# Patient Record
Sex: FEMALE | Race: WHITE | NOT HISPANIC OR LATINO | Employment: FULL TIME | URBAN - METROPOLITAN AREA
[De-identification: names, ages, dates, MRNs, and addresses within clinical notes are randomized per-mention and may not be internally consistent; named-entity substitution may affect disease eponyms.]

---

## 2021-03-22 ENCOUNTER — TELEPHONE (OUTPATIENT)
Dept: OBSTETRICS AND GYNECOLOGY | Facility: CLINIC | Age: 61
End: 2021-03-22

## 2021-04-26 ENCOUNTER — OFFICE VISIT (OUTPATIENT)
Dept: OBSTETRICS AND GYNECOLOGY | Facility: CLINIC | Age: 61
End: 2021-04-26
Attending: OBSTETRICS & GYNECOLOGY
Payer: COMMERCIAL

## 2021-04-26 VITALS
BODY MASS INDEX: 28.48 KG/M2 | WEIGHT: 181.44 LBS | SYSTOLIC BLOOD PRESSURE: 134 MMHG | HEIGHT: 67 IN | DIASTOLIC BLOOD PRESSURE: 88 MMHG

## 2021-04-26 DIAGNOSIS — N95.2 VAGINAL ATROPHY: Primary | ICD-10-CM

## 2021-04-26 PROCEDURE — 99203 PR OFFICE/OUTPT VISIT, NEW, LEVL III, 30-44 MIN: ICD-10-PCS | Mod: S$GLB,,, | Performed by: OBSTETRICS & GYNECOLOGY

## 2021-04-26 PROCEDURE — 99999 PR PBB SHADOW E&M-EST. PATIENT-LVL III: CPT | Mod: PBBFAC,,, | Performed by: OBSTETRICS & GYNECOLOGY

## 2021-04-26 PROCEDURE — 99999 PR PBB SHADOW E&M-EST. PATIENT-LVL III: ICD-10-PCS | Mod: PBBFAC,,, | Performed by: OBSTETRICS & GYNECOLOGY

## 2021-04-26 PROCEDURE — 99203 OFFICE O/P NEW LOW 30 MIN: CPT | Mod: S$GLB,,, | Performed by: OBSTETRICS & GYNECOLOGY

## 2021-04-26 RX ORDER — ESTRADIOL 0.1 MG/G
CREAM VAGINAL
Qty: 42.5 G | Refills: 0 | Status: SHIPPED | OUTPATIENT
Start: 2021-04-26 | End: 2022-11-17 | Stop reason: SDUPTHER

## 2021-04-26 RX ORDER — METOPROLOL TARTRATE 25 MG/1
25 TABLET, FILM COATED ORAL 2 TIMES DAILY
COMMUNITY
Start: 2021-02-05

## 2021-06-28 ENCOUNTER — CLINICAL SUPPORT (OUTPATIENT)
Dept: REHABILITATION | Facility: HOSPITAL | Age: 61
End: 2021-06-28
Payer: COMMERCIAL

## 2021-06-28 DIAGNOSIS — G89.29 CHRONIC PAIN OF RIGHT ANKLE: ICD-10-CM

## 2021-06-28 DIAGNOSIS — M76.821 POSTERIOR TIBIAL TENDINITIS OF RIGHT LEG: ICD-10-CM

## 2021-06-28 DIAGNOSIS — M76.61 TENDONITIS, ACHILLES, RIGHT: ICD-10-CM

## 2021-06-28 DIAGNOSIS — M25.571 CHRONIC PAIN OF RIGHT ANKLE: ICD-10-CM

## 2021-06-28 PROCEDURE — 97110 THERAPEUTIC EXERCISES: CPT | Mod: PO

## 2021-06-28 PROCEDURE — 97162 PT EVAL MOD COMPLEX 30 MIN: CPT | Mod: PO

## 2021-06-30 ENCOUNTER — CLINICAL SUPPORT (OUTPATIENT)
Dept: REHABILITATION | Facility: HOSPITAL | Age: 61
End: 2021-06-30
Payer: COMMERCIAL

## 2021-06-30 DIAGNOSIS — M76.821 POSTERIOR TIBIAL TENDINITIS OF RIGHT LEG: ICD-10-CM

## 2021-06-30 DIAGNOSIS — M25.571 CHRONIC PAIN OF RIGHT ANKLE: ICD-10-CM

## 2021-06-30 DIAGNOSIS — G89.29 CHRONIC PAIN OF RIGHT ANKLE: ICD-10-CM

## 2021-06-30 DIAGNOSIS — M76.61 TENDONITIS, ACHILLES, RIGHT: Primary | ICD-10-CM

## 2021-06-30 PROCEDURE — 97140 MANUAL THERAPY 1/> REGIONS: CPT | Mod: PO

## 2021-06-30 PROCEDURE — 97110 THERAPEUTIC EXERCISES: CPT | Mod: PO

## 2021-07-12 ENCOUNTER — CLINICAL SUPPORT (OUTPATIENT)
Dept: REHABILITATION | Facility: HOSPITAL | Age: 61
End: 2021-07-12
Payer: COMMERCIAL

## 2021-07-12 DIAGNOSIS — M76.821 POSTERIOR TIBIAL TENDINITIS OF RIGHT LEG: ICD-10-CM

## 2021-07-12 DIAGNOSIS — M25.571 CHRONIC PAIN OF RIGHT ANKLE: ICD-10-CM

## 2021-07-12 DIAGNOSIS — M76.61 TENDONITIS, ACHILLES, RIGHT: ICD-10-CM

## 2021-07-12 DIAGNOSIS — G89.29 CHRONIC PAIN OF RIGHT ANKLE: ICD-10-CM

## 2021-07-12 PROCEDURE — 97110 THERAPEUTIC EXERCISES: CPT | Mod: PO,CQ

## 2021-07-12 PROCEDURE — 97140 MANUAL THERAPY 1/> REGIONS: CPT | Mod: PO,CQ

## 2021-07-15 ENCOUNTER — CLINICAL SUPPORT (OUTPATIENT)
Dept: REHABILITATION | Facility: HOSPITAL | Age: 61
End: 2021-07-15
Payer: COMMERCIAL

## 2021-07-15 DIAGNOSIS — M76.61 TENDONITIS, ACHILLES, RIGHT: ICD-10-CM

## 2021-07-15 DIAGNOSIS — M76.821 POSTERIOR TIBIAL TENDINITIS OF RIGHT LEG: ICD-10-CM

## 2021-07-15 DIAGNOSIS — G89.29 CHRONIC PAIN OF RIGHT ANKLE: ICD-10-CM

## 2021-07-15 DIAGNOSIS — M25.571 CHRONIC PAIN OF RIGHT ANKLE: ICD-10-CM

## 2021-07-15 PROCEDURE — 97140 MANUAL THERAPY 1/> REGIONS: CPT | Mod: PO

## 2021-07-15 PROCEDURE — 97110 THERAPEUTIC EXERCISES: CPT | Mod: PO

## 2021-07-28 ENCOUNTER — PATIENT MESSAGE (OUTPATIENT)
Dept: OBSTETRICS AND GYNECOLOGY | Facility: CLINIC | Age: 61
End: 2021-07-28

## 2021-07-30 ENCOUNTER — OFFICE VISIT (OUTPATIENT)
Dept: OBSTETRICS AND GYNECOLOGY | Facility: CLINIC | Age: 61
End: 2021-07-30
Attending: OBSTETRICS & GYNECOLOGY
Payer: COMMERCIAL

## 2021-07-30 VITALS
SYSTOLIC BLOOD PRESSURE: 114 MMHG | DIASTOLIC BLOOD PRESSURE: 76 MMHG | HEIGHT: 67 IN | BODY MASS INDEX: 28.72 KG/M2 | WEIGHT: 183 LBS

## 2021-07-30 DIAGNOSIS — Z12.31 ENCOUNTER FOR SCREENING MAMMOGRAM FOR BREAST CANCER: ICD-10-CM

## 2021-07-30 DIAGNOSIS — Z01.419 ENCOUNTER FOR GYNECOLOGICAL EXAMINATION: Primary | ICD-10-CM

## 2021-07-30 PROCEDURE — 99999 PR PBB SHADOW E&M-EST. PATIENT-LVL III: ICD-10-PCS | Mod: PBBFAC,,, | Performed by: OBSTETRICS & GYNECOLOGY

## 2021-07-30 PROCEDURE — 99999 PR PBB SHADOW E&M-EST. PATIENT-LVL III: CPT | Mod: PBBFAC,,, | Performed by: OBSTETRICS & GYNECOLOGY

## 2021-07-30 PROCEDURE — 99396 PR PREVENTIVE VISIT,EST,40-64: ICD-10-PCS | Mod: S$GLB,,, | Performed by: OBSTETRICS & GYNECOLOGY

## 2021-07-30 PROCEDURE — 99396 PREV VISIT EST AGE 40-64: CPT | Mod: S$GLB,,, | Performed by: OBSTETRICS & GYNECOLOGY

## 2021-07-30 RX ORDER — FLUTICASONE PROPIONATE 50 MCG
SPRAY, SUSPENSION (ML) NASAL
COMMUNITY

## 2021-07-30 RX ORDER — RABEPRAZOLE SODIUM 20 MG/1
TABLET, DELAYED RELEASE ORAL
COMMUNITY

## 2021-07-30 RX ORDER — MUPIROCIN 20 MG/G
OINTMENT TOPICAL
COMMUNITY

## 2022-08-10 ENCOUNTER — PATIENT MESSAGE (OUTPATIENT)
Dept: OBSTETRICS AND GYNECOLOGY | Facility: CLINIC | Age: 62
End: 2022-08-10
Payer: COMMERCIAL

## 2022-11-17 ENCOUNTER — OFFICE VISIT (OUTPATIENT)
Dept: OBSTETRICS AND GYNECOLOGY | Facility: CLINIC | Age: 62
End: 2022-11-17
Attending: OBSTETRICS & GYNECOLOGY
Payer: COMMERCIAL

## 2022-11-17 VITALS
BODY MASS INDEX: 28.46 KG/M2 | HEIGHT: 67 IN | DIASTOLIC BLOOD PRESSURE: 82 MMHG | WEIGHT: 181.31 LBS | SYSTOLIC BLOOD PRESSURE: 132 MMHG

## 2022-11-17 DIAGNOSIS — N90.9 LESION OF FEMALE PERINEUM: ICD-10-CM

## 2022-11-17 DIAGNOSIS — Z01.419 ENCOUNTER FOR GYNECOLOGICAL EXAMINATION: Primary | ICD-10-CM

## 2022-11-17 DIAGNOSIS — N81.6 RECTOCELE: ICD-10-CM

## 2022-11-17 PROBLEM — I21.9 MYOCARDIAL INFARCTION: Status: ACTIVE | Noted: 2020-11-15

## 2022-11-17 PROBLEM — I25.10 ARTERIOSCLEROSIS OF CORONARY ARTERY: Status: ACTIVE | Noted: 2020-11-15

## 2022-11-17 PROBLEM — R94.31 ABNORMAL ELECTROCARDIOGRAPHY: Status: ACTIVE | Noted: 2020-11-15

## 2022-11-17 PROBLEM — R00.2 PALPITATIONS: Status: ACTIVE | Noted: 2020-11-15

## 2022-11-17 PROBLEM — K21.9 GASTROESOPHAGEAL REFLUX DISEASE: Status: ACTIVE | Noted: 2020-11-15

## 2022-11-17 PROBLEM — E78.5 HYPERLIPIDEMIA: Status: ACTIVE | Noted: 2022-11-17

## 2022-11-17 PROBLEM — K57.90 DIVERTICULAR DISEASE: Status: ACTIVE | Noted: 2020-11-15

## 2022-11-17 PROBLEM — Z00.00 WELLNESS EXAMINATION: Status: ACTIVE | Noted: 2022-11-17

## 2022-11-17 PROBLEM — M62.830 SPASM OF BACK MUSCLES: Status: ACTIVE | Noted: 2020-11-15

## 2022-11-17 PROBLEM — D25.9 UTERINE LEIOMYOMA: Status: ACTIVE | Noted: 2022-11-17

## 2022-11-17 PROCEDURE — 99999 PR PBB SHADOW E&M-EST. PATIENT-LVL IV: ICD-10-PCS | Mod: PBBFAC,,, | Performed by: STUDENT IN AN ORGANIZED HEALTH CARE EDUCATION/TRAINING PROGRAM

## 2022-11-17 PROCEDURE — 99999 PR PBB SHADOW E&M-EST. PATIENT-LVL IV: CPT | Mod: PBBFAC,,, | Performed by: STUDENT IN AN ORGANIZED HEALTH CARE EDUCATION/TRAINING PROGRAM

## 2022-11-17 PROCEDURE — 88312 SPECIAL STAINS GROUP 1: CPT | Performed by: PATHOLOGY

## 2022-11-17 PROCEDURE — 99396 PREV VISIT EST AGE 40-64: CPT | Mod: S$GLB,,, | Performed by: STUDENT IN AN ORGANIZED HEALTH CARE EDUCATION/TRAINING PROGRAM

## 2022-11-17 PROCEDURE — 99396 PR PREVENTIVE VISIT,EST,40-64: ICD-10-PCS | Mod: S$GLB,,, | Performed by: STUDENT IN AN ORGANIZED HEALTH CARE EDUCATION/TRAINING PROGRAM

## 2022-11-17 PROCEDURE — 88305 TISSUE EXAM BY PATHOLOGIST: CPT | Performed by: PATHOLOGY

## 2022-11-17 PROCEDURE — 88312 SPECIAL STAINS GROUP 1: CPT | Mod: 26,,, | Performed by: PATHOLOGY

## 2022-11-17 PROCEDURE — 88305 TISSUE EXAM BY PATHOLOGIST: ICD-10-PCS | Mod: 26,,, | Performed by: PATHOLOGY

## 2022-11-17 PROCEDURE — 88312 PR  SPECIAL STAINS,GROUP I: ICD-10-PCS | Mod: 26,,, | Performed by: PATHOLOGY

## 2022-11-17 PROCEDURE — 88305 TISSUE EXAM BY PATHOLOGIST: CPT | Mod: 26,,, | Performed by: PATHOLOGY

## 2022-11-17 RX ORDER — ROSUVASTATIN CALCIUM 5 MG/1
1 TABLET, COATED ORAL DAILY
COMMUNITY
Start: 2022-05-11

## 2022-11-17 RX ORDER — ESTRADIOL 0.1 MG/G
CREAM VAGINAL
Qty: 42.5 G | Refills: 0 | Status: SHIPPED | OUTPATIENT
Start: 2022-11-17 | End: 2023-07-26 | Stop reason: SDUPTHER

## 2022-11-17 RX ORDER — ROSUVASTATIN CALCIUM 5 MG/1
5 TABLET, COATED ORAL DAILY
COMMUNITY
Start: 2022-08-15

## 2022-11-17 NOTE — PROGRESS NOTES
"Chief Complaint: Well Woman Exam     HPI:      Cherry Kaiser is a 62 y.o.  who presents today for well woman exam.  Patient has had a hysterectomy. Using estrogen cream on perineum. Also does have some hot flashes. Also endorses that sometimes she has to strain and manually splint when defecating, interested in UroGyn referral. No other complaints. Specifically, patient denies abnormal vaginal bleeding, discharge, pelvic pain, urinary problems, or changes in appetite. Ms. Kaiser is currently sexually active with a single male partner.    Previous Pap: normal , no longer indicated   Previous Mammogram: reports normal - has next one scheduled at [facility] next week   Most Recent Colonoscopy: UTD per pt    Past Medical History:   Diagnosis Date    Anemia     Heart attack 2016    Hyperlipidemia      Past Surgical History:   Procedure Laterality Date    HYSTERECTOMY      WISDOM TOOTH EXTRACTION       Social History     Socioeconomic History    Marital status:    Tobacco Use    Smoking status: Never    Smokeless tobacco: Never   Substance and Sexual Activity    Alcohol use: Yes     Comment: occasional     Drug use: Never    Sexual activity: Yes     Partners: Male     Birth control/protection: See Surgical Hx     Review of patient's allergies indicates:  No Known Allergies    Family History   Problem Relation Age of Onset    Hyperlipidemia Father     Progressive Supranuclear Palsy Father     Breast cancer Mother     Colon cancer Mother     Breast cancer Maternal Aunt     Diabetes Neg Hx     Hypertension Neg Hx     Ovarian cancer Neg Hx     Stroke Neg Hx      OB History          3    Para   3    Term   3            AB        Living   3         SAB        IAB        Ectopic        Multiple        Live Births   3               Physical Exam:      PHYSICAL EXAM:  /82   Ht 5' 7" (1.702 m)   Wt 82.2 kg (181 lb 5.3 oz)   BMI 28.40 kg/m²   Body mass index is 28.4 kg/m².     APPEARANCE: " Well nourished, well developed, in no acute distress.  PSYCH: Appropriate mood and affect.  SKIN: No acne or hirsutism  NECK: Neck symmetric without masses  NODES: No inguinal, axillary, lymph node enlargement  ABDOMEN: Soft.  No tenderness or masses.    CARDIOVASCULAR: No edema of peripheral extremities  BREASTS: Symmetrical, no visible skin lesions. No palpable masses. No nipple discharge bilaterally.  PELVIC: Normal external genitalia without lesions.  Normal hair distribution.  Adequate perineal body, normal urethral meatus. Flat macular lesion on right side of perineum - pt reports this is new.  Vagina moist and smooth, atrophic. Without lesions. Without discharge.  Cervix absnet.  No significant cystocele. +mild rectocele.  Bimanual exam shows absent uterus. No midline masses.    Assessment/Plan:     Encounter for gynecological examination  - pelvic exam normal  - paps no longer indicated  - [facility] next wk  - reports colonoscopy UTD  - UroGyn referral for symptomatic rectocele  - lesion of perineum, biopsied, see procedure note  - refill estrogen cream to pharmacy    Rectocele  -     Ambulatory referral/consult to Urogynecology; Future; Expected date: 11/24/2022    Lesion of female perineum  -     Specimen to Pathology, Ob/Gyn      Follow up in about 1 year (around 11/17/2023) for annual exam.    Counseling:     Patient was counseled today on current ASCCP pap guidelines, the recommendation for yearly physical exams, safe driving habits, breast self awareness and annual mammograms. She is to see her PCP for other health maintenance.     Use of the [facility] Patient Portal discussed and encouraged during today's visit.     Deena Nieto MD  Obstetrics and Gynecology  [facility]

## 2022-12-01 LAB
FINAL PATHOLOGIC DIAGNOSIS: NORMAL
GROSS: NORMAL
Lab: NORMAL
MICROSCOPIC EXAM: NORMAL

## 2022-12-05 ENCOUNTER — CLINICAL SUPPORT (OUTPATIENT)
Dept: UROGYNECOLOGY | Facility: CLINIC | Age: 62
End: 2022-12-05
Attending: STUDENT IN AN ORGANIZED HEALTH CARE EDUCATION/TRAINING PROGRAM
Payer: COMMERCIAL

## 2022-12-05 DIAGNOSIS — N81.6 RECTOCELE: ICD-10-CM

## 2022-12-05 NOTE — PROGRESS NOTES
Established Patient - Audio Only Telehealth Visit     The patient location is: home  The chief complaint leading to consultation is: POP  Visit type: Virtual visit with audio only (telephone)  Total time spent with patient: 7min    Cherry Kaiser complains of trouble defecating since her hysterectomy 6 years ago. She reports that she often needs to press on her perineum to void when stool is more firm. Fiber makes this worse. She also reports rare urinary incontinence.      Referring Provider:  Deena Nieto MD  Pelvic Health Screening:     Do you...   Usually experience frequent urination? no  Usually leak urine with a feeling of urgency or strong sensation of needing to go to the bathroom? no  Usually leak urine with coughing, sneezing, laughing or exercise or exertion? no  Usually experience small amounts or drops of urine leakage? no    Have a bulge or something falling out that you can see or feel in your vaginal area or rectum? yes  Have to push on the vagina or around the rectum to have complete a bowel movement? yes  Usually push up on the bulge in the vaginal area with your fingers to start or complete urination? no    Usually lose stool beyond your control or have trouble with stool passing? no  Experience a strong sense of urgency and must rush to the bathroom to have a bowel movement? no    Patient identifies POP as most bothersome.    Background:  Relevant History:  OB:  Hysterectomy    Other:  Heart attack  HLD       OB History    Para Term  AB Living   3 3 3 0 0 3   SAB IAB Ectopic Multiple Live Births   0 0 0 0 3        Previous Treatment:  None per patient    Patient educated on pelvic health, including symptomatic and asymptomatic POP and directed to the pelvic health website and [employer] resources for additional information. Appt scheduled and confirmed.

## 2022-12-06 ENCOUNTER — PATIENT MESSAGE (OUTPATIENT)
Dept: UROLOGY | Facility: CLINIC | Age: 62
End: 2022-12-06
Payer: COMMERCIAL

## 2022-12-09 ENCOUNTER — TELEPHONE (OUTPATIENT)
Dept: SURGERY | Facility: CLINIC | Age: 62
End: 2022-12-09
Payer: COMMERCIAL

## 2022-12-09 NOTE — TELEPHONE ENCOUNTER
Called patient back. No answer, left a message. This is regarding appointment on 12/21, it needs to be rescheduled to 12/20, if patient is able to make it.

## 2022-12-09 NOTE — TELEPHONE ENCOUNTER
----- Message from Clare Miller sent at 12/9/2022  1:10 PM CST -----  Contact: pt  Pt requesting call back RE: returning call in regards to appt change, would like to know what time        Confirmed contact below:  Contact Name:Cherry Kaiser  Phone Number: [phone]

## 2022-12-14 ENCOUNTER — TELEPHONE (OUTPATIENT)
Dept: SURGERY | Facility: CLINIC | Age: 62
End: 2022-12-14
Payer: COMMERCIAL

## 2022-12-14 NOTE — TELEPHONE ENCOUNTER
Talked to patient, will rescheduled 12/21 appointment to 01/04 in multi-d clinic with both Dr. Garcia and Dr. Macias. Patient verbalized understanding.

## 2022-12-14 NOTE — TELEPHONE ENCOUNTER
----- Message from Jackie Gaspar RN sent at 12/14/2022  9:04 AM CST -----  Regarding: FW: Move to Jan 4th [facility]    ----- Message -----  From: Corrie Garcia MD  Sent: 12/13/2022   4:30 PM CST  To: Radha Denton Staff  Subject: Move to Jan 4th [facility]

## 2022-12-23 ENCOUNTER — TELEPHONE (OUTPATIENT)
Dept: UROGYNECOLOGY | Facility: CLINIC | Age: 62
End: 2022-12-23
Payer: COMMERCIAL

## 2022-12-23 NOTE — TELEPHONE ENCOUNTER
Spoke to patient, told her to arrive at 9 am to see Dr. Garcia, then Dr. Macias will see at 9:30.

## 2022-12-23 NOTE — TELEPHONE ENCOUNTER
----- Message from Faiza Gallegos sent at 12/23/2022 12:11 PM CST -----  Regarding: rt a missed call   Type:  Patient Returning Call    Who Called: JULIA ALCANTAR [[id number]]    Who Left Message for Patient: unknown    Does the patient know what this is regarding? No     Best Call Back Number:[phone]    Additional Information:

## 2023-01-03 NOTE — PROGRESS NOTES
"CRS Office Visit History and Physical    Referring Md:   Corrie Garcia Md  [address] [place] [address]    SUBJECTIVE:     Chief Complaint: Splinting, constipation    History of Present Illness:  The patient is new patient to this practice.   Course is as follows:  Patient is a 62 y.o. female presents with splinting and constipation.  Has to press on perineum to evacuate her rectum when her stools are hard.  Stool is hard most of the time.  Started after her vaginal hysterectomy 10 years ago (c/b cystotomy), but worse over the last year.  Also sees a bulge from vagina when she is sitting on toilet.  Tried Metamucil in the past ( had diarrhea when she took full dose mixed with smoothie, then decreased dose and felt like it made her stools hard).  Drinks 3-4 glasses of water/day.  No prior anorectal surgery.  Mother with colon cancer > 61yo.   Gets scopes with [facility], last was last year, has had polyps in the past.  Had diverticulitis last year.    Review of patient's allergies indicates:  No Known Allergies    Past Medical History:   Diagnosis Date    Anemia     Heart attack 2016    Hyperlipidemia      Past Surgical History:   Procedure Laterality Date    HYSTERECTOMY      WISDOM TOOTH EXTRACTION       Family History   Problem Relation Age of Onset    Hyperlipidemia Father     Progressive Supranuclear Palsy Father     Breast cancer Mother     Colon cancer Mother     Breast cancer Maternal Aunt     Diabetes Neg Hx     Hypertension Neg Hx     Ovarian cancer Neg Hx     Stroke Neg Hx      Social History     Tobacco Use    Smoking status: Never    Smokeless tobacco: Never   Substance Use Topics    Alcohol use: Yes     Comment: occasional     Drug use: Never        Review of Systems:  ROS    OBJECTIVE:     Vital Signs (Most Recent)  /63 (BP Location: Left arm, Patient Position: Sitting)   Pulse 76   Resp 19   Ht 5' 7" (1.702 m)   Wt 84 kg (185 lb 3.2 oz)   SpO2 98%   BMI 29.01 kg/m² "     Physical Exam:  General: White female in no distress   Neuro: Alert and oriented x 4.  Moves all extremities.     HEENT: No icterus.  Trachea midline  Respiratory: Respirations are even and unlabored  Cardiac: Regular rate  Abdomen: Soft, non-distended  Extremities: Warm dry and intact  Skin: No rashes    Examined with Dr Macias, in [facility]    Anorectal:   External exam: Perianal skin with small skin tags, rectocele to introitus at rest. Perineal body in tact with some lichen sclerosis.  Digital exam revealed normal tone. No masses.  Some tenderness with exam. No masses.  + rectocele. Partial incomplete relaxation with Valsalva.      ASSESSMENT/PLAN:     61yo F w/ hard stools, rectocele, and need to splint to empty  - Started daily fiber supplement (Citrucel), can switch to MiraLax if worsens symptoms  - Increase water intake to at least 8 glasses of water per day.  - Pelvic Floor PT referral  - Seen with Dr Macias as well today    Corrie Garcia MD  Staff Surgeon, Colon and Rectal Surgery  [facility]

## 2023-01-04 ENCOUNTER — OFFICE VISIT (OUTPATIENT)
Dept: SURGERY | Facility: CLINIC | Age: 63
End: 2023-01-04
Attending: COLON & RECTAL SURGERY
Payer: COMMERCIAL

## 2023-01-04 ENCOUNTER — OFFICE VISIT (OUTPATIENT)
Dept: UROGYNECOLOGY | Facility: CLINIC | Age: 63
End: 2023-01-04
Payer: COMMERCIAL

## 2023-01-04 VITALS
OXYGEN SATURATION: 90 % | HEIGHT: 67 IN | WEIGHT: 185.19 LBS | HEART RATE: 76 BPM | RESPIRATION RATE: 19 BRPM | HEIGHT: 67 IN | OXYGEN SATURATION: 98 % | SYSTOLIC BLOOD PRESSURE: 119 MMHG | DIASTOLIC BLOOD PRESSURE: 63 MMHG | BODY MASS INDEX: 29.07 KG/M2 | BODY MASS INDEX: 29.07 KG/M2 | WEIGHT: 185.19 LBS | HEART RATE: 76 BPM | DIASTOLIC BLOOD PRESSURE: 63 MMHG | RESPIRATION RATE: 19 BRPM | SYSTOLIC BLOOD PRESSURE: 119 MMHG

## 2023-01-04 DIAGNOSIS — Z80.3 FAMILY HISTORY OF BREAST CANCER: ICD-10-CM

## 2023-01-04 DIAGNOSIS — Z80.0 FAMILY HISTORY OF COLON CANCER: Primary | ICD-10-CM

## 2023-01-04 DIAGNOSIS — N81.6 RECTOCELE: ICD-10-CM

## 2023-01-04 DIAGNOSIS — L90.0 LICHEN SCLEROSUS: ICD-10-CM

## 2023-01-04 DIAGNOSIS — N81.6 RECTOCELE: Primary | ICD-10-CM

## 2023-01-04 DIAGNOSIS — N39.46 URINARY INCONTINENCE, MIXED: ICD-10-CM

## 2023-01-04 DIAGNOSIS — K59.09 CHRONIC CONSTIPATION: ICD-10-CM

## 2023-01-04 DIAGNOSIS — R15.0 INCOMPLETE DEFECATION: ICD-10-CM

## 2023-01-04 DIAGNOSIS — N81.11 CYSTOCELE, MIDLINE: ICD-10-CM

## 2023-01-04 DIAGNOSIS — K64.9 HEMORRHOIDS, UNSPECIFIED HEMORRHOID TYPE: ICD-10-CM

## 2023-01-04 PROCEDURE — 99999 PR PBB SHADOW E&M-EST. PATIENT-LVL IV: ICD-10-PCS | Mod: PBBFAC,,, | Performed by: COLON & RECTAL SURGERY

## 2023-01-04 PROCEDURE — 99203 OFFICE O/P NEW LOW 30 MIN: CPT | Mod: S$GLB,,, | Performed by: COLON & RECTAL SURGERY

## 2023-01-04 PROCEDURE — 51701 INSERT BLADDER CATHETER: CPT | Mod: S$GLB,,, | Performed by: OBSTETRICS & GYNECOLOGY

## 2023-01-04 PROCEDURE — 99999 PR PBB SHADOW E&M-EST. PATIENT-LVL V: ICD-10-PCS | Mod: PBBFAC,,, | Performed by: OBSTETRICS & GYNECOLOGY

## 2023-01-04 PROCEDURE — 99203 PR OFFICE/OUTPT VISIT, NEW, LEVL III, 30-44 MIN: ICD-10-PCS | Mod: S$GLB,,, | Performed by: COLON & RECTAL SURGERY

## 2023-01-04 PROCEDURE — 87086 URINE CULTURE/COLONY COUNT: CPT | Performed by: OBSTETRICS & GYNECOLOGY

## 2023-01-04 PROCEDURE — 99215 OFFICE O/P EST HI 40 MIN: CPT | Mod: 25,S$GLB,, | Performed by: OBSTETRICS & GYNECOLOGY

## 2023-01-04 PROCEDURE — 99999 PR PBB SHADOW E&M-EST. PATIENT-LVL V: CPT | Mod: PBBFAC,,, | Performed by: OBSTETRICS & GYNECOLOGY

## 2023-01-04 PROCEDURE — 99215 PR OFFICE/OUTPT VISIT, EST, LEVL V, 40-54 MIN: ICD-10-PCS | Mod: 25,S$GLB,, | Performed by: OBSTETRICS & GYNECOLOGY

## 2023-01-04 PROCEDURE — 51701 PR INSERTION OF NON-INDWELLING BLADDER CATHETERIZATION FOR RESIDUAL UR: ICD-10-PCS | Mod: S$GLB,,, | Performed by: OBSTETRICS & GYNECOLOGY

## 2023-01-04 PROCEDURE — 99999 PR PBB SHADOW E&M-EST. PATIENT-LVL IV: CPT | Mod: PBBFAC,,, | Performed by: COLON & RECTAL SURGERY

## 2023-01-04 RX ORDER — CLOBETASOL PROPIONATE 0.5 MG/G
OINTMENT TOPICAL DAILY
Qty: 30 G | Refills: 3 | Status: SHIPPED | OUTPATIENT
Start: 2023-01-04

## 2023-01-04 NOTE — PATIENT INSTRUCTIONS
Fiber Information Sheet  Your doctor has recommended that you follow a high fiber diet. The addition of fiber to your diet can make an enormous difference in your bowel control and regularity. Fiber helps people whether they lose stool or have trouble with constipation. Fiber works by bulking the stool and keeping it formed, yet making the movement soft and easy to pass. Fiber helps keep moisture within the stool so that neither diarrhea nor hard stool occurs. Fiber makes the bowels work more regularly, but it is not a laxative. An additional bonus from eating a high fiber diet is that your risk of cancer is reduced, too.    Most of us eat some high fiber foods already, but nearly all of us do not eat the necessary amount. For example, a slice of whole wheat bread contains only about 10% of the daily recommended amount of fiber. This means if you are relying on only whole wheat bread to meet the recommended fiber requirements, you would need to eat  between 10-18 slices every day! Please note that fiber is NOT in any meat or dairy product. It is only found in grains, vegetables and fruits. The recommended daily fiber intake is 20-25 grams. Foods having high fiber content include:     Fiber One Cereal, ½ cup 13.0 g   Christina beans, ¾ cup 10.4 g   Wheat bran cereal, 1 oz 10.0 g   Kidney beans, ¾ cup 9.3 g   All Bran Cereal, ½ cup 6.0 g   Oat Bran Cereal, hot, 1 oz 4.0 g   Banana, 1medium 3.8 g   Canned pears, ½ cup 3.7 g   3 prunes or ¼ cup raisins 3.5 g   Whole Wheat Total, 1 cup 3.0 g   Carrots, ½ cup 3.2 g   Apple, small 2.8 g   Broccoli, ½ cup 2.8 g   Cauliflower, ½ cup 2.6 g   Oatmeal, 1 oz 2.5 g   Whole Wheat Toast 2.0 g   Cheerios, 1 1/3 cup 2.0 g   Baked potato with skin 2.0 g   Corn, ½ cup 1.9 g   Popcorn, 3 cups 1.9 g   Orange, medium 1.9 g   Granola bar 1.0 g   Lettuce, ½ cup 0.9 g    If you dont think that you can get enough fiber through your everyday diet, there are many good fiber supplements you can  take along with eating your high fiber diet. Some of these are: Metamucil (1 heaping teaspoon or 1-2 wafers), Citrucel (1 tablespoon), Fiberall (1-2 wafers or 1 teaspoon), Perdium (2 rounded teaspoons) and 1-2 teaspoons unprocessed bran (to mix with foods)    You may need to use the fiber supplement up to 3-4 times daily to produce normal elimination. Please follow specific package directions or call us for help in regulating the dose. You may notice some bloating and/or increased gas at first. These symptoms can be relieved by adding fiber to your diet slowly. Once your body gets used to this increased fiber, these symptoms will go away.   -------------------------------------------------------------------------------------    Bladder Irritants  Certain foods and drinks have been associated with worsening symptoms of urinary frequency, urgency, urge incontinence, or bladder pain. If you suffer from any of these conditions, you may wish to try eliminating one or more of these foods from your diet and see if your symptoms improve. If bladder symptoms are related to dietary factors, strict adherence to a diet thateliminates the food should bring marked relief in 10 days. Once you are feeling better, you can begin to add foods back into your diet, one at a time. If symptoms return, you will be able to identify the irritant. As you add foods back to your diet it is very important that you drink significant amounts of water.    -----------------------------------------------------------------------------------------------  List of Common Bladder Irritants*  Alcoholic beverages  Apples and apple juice  Cantaloupe  Carbonated beverages  Chili and spicy foods  Chocolate  Citrus fruit  Coffee (including decaffeinated)  Cranberries and cranberry juice  Grapes  Guava  Milk Products: milk, cheese, cottage cheese, yogurt, ice cream  Peaches  Pineapple  Plums  Strawberries  Sugar especially artificial sweeteners, saccharin,  aspartame, corn sweeteners, honey, fructose, sucrose, lactose  Tea  Tomatoes and tomato juice  Vitamin B complex  Vinegar  *Most people are not sensitive to ALL of these products; your goal is to find the foods that make YOUR symptoms worse.  ---------------------------------------------------------------------------------------------------    Low-acid fruit substitutions include apricots, papaya, pears and watermelon. Coffee drinkers can drink Kava or other lowacid instant drinks. Tea drinkers can substitute non-citrus herbal and sun brewed teas. Calcium carbonate co-buffered with calcium ascorbate can be substituted for Vitamin C. Prelief is a dietary supplement that works as an acid blocker for the bladder.    Where to get more information:        Overcoming Bladder Disorders by Katie Thomas and Bhupendra Smith, 1990        You Dont Have to Live with Cystitis! By Breanne Pederson, 1988  http://www.urologymanagement.org/oab  --------------------------------------------------------------------    1)  Mixed urinary incontinence, urge < stress:    --urine C&S  --Empty bladder every 3 hours.  Empty well: wait a minute, lean forward on toilet.    --Avoid dietary irritants (see sheet).  Keep diary x 3-5 days to determine your irritants.  AYESHA/MICHELLE:  Krystyna Goodman, PT, DPT or Marcy Rico PT, DPT ([employer] [employer] [facility] [employer]): (p) [phone].  (f) [phone].  --EARLY/LATE APPT OPTIONS:   TU, TH, FRI: 620 AM 1st APPT    M-TH (EVERY DAY BUT FRI): 520 PM LAST APPT    --URGE: consider medication in future. Takes 2-4 weeks to see if will have effect.  For dry mouth: get sour, sugar free lozenge or gum.    --STRESS:  Pessary vs. Sling.     2)  Concern for lichen sclerosis:  --EVERY AM apply steroid ointment/cream:  Apply dime-sized amount with finger to vaginal opening, inner lips, and all external areas (including around anus) that are irritated.  DO NOT APPLY  INTERNALLY.   --EVERY PM apply estrogen cream.  Apply dime-sized amount with finger to vaginal opening, inner lips, and all external areas (including around anus) that are irritated. Also apply small amount inside vagina with finger (insert to knuckle).    3)  Constipation with incomplete defecation, +hemorrhoids:  --hydrate well: 6-8 glasses/day (approx 64 oz)  Controlling constipation may help bladder urgency/leakage and fiber may better control cholesterol and blood glucose.  Start daily fiber.  Take 1 tsp of fiber powder (methylcellulose [citrucel] or other sugar-free powder).  Mix in 8 oz of water.  Take x 3-5 days.  Then, increase fiber by 1 tsp every 3-5 days until stool is easy to pass, less incomplete defecation.  Stop and continue at that dose.   Do not exceed 6 tsps/day.  May also use over the counter stool softener 1-2 x/day.  AVOID laxatives.    4)  Stage 2 rectocele/cystocele:  --discussed  --observation for now  --work on getting stool consistency better 1st  --don't sit on toilet more than 5 minutes  --consider surgery (A&P repair) if not improving  --start PT    5)  FH breast and colon cancer:  --genetics consult    6)  RTC 3 months.

## 2023-01-04 NOTE — PROGRESS NOTES
[facility] - [facility]BGYN [facility]  [address]  [facility] [address]    Cherry Kaiser  [id number]  1960  2023    Consulting Physician: Corrie Garcia MD   GYN: MD Abner; MD Konrad  Primary M.D.: Baldemar Vilchis MD    No chief complaint on file.      HPI:     1)  UI:  (+) HAYLIE (full bladder)  > (+) UUI (rare).  (--) pads. Daytime frequency: Q 3 hours.  Nocturia: No. (--) dysuria,  (--) hematuria,  (--) frequent UTIs.  (+) complete bladder emptying.     2)  POP:  Present--saw something in mirror when applying vaginal estrogen for lichen. Symptoms: (--).  (--) vaginal bleeding. (--) vaginal discharge. (+) sexually active.  (--) dyspareunia.  (+)  Vaginal dryness.  (+) vaginal estrogen use.     3)  BM:  (+) constipation/straining. Takes fish oil, magnesium PRN. Fiber bulked too much.  (--) chronic diarrhea. (--) hematochezia. +BRB on wiping with hemorrhoids.  (--) fecal incontinence.  (--) fecal smearing/urgency.  (+)/-- complete evacuation.      Past Medical History  Past Medical History:   Diagnosis Date    Anemia     Heart attack     Hyperlipidemia         Past Surgical History  Past Surgical History:   Procedure Laterality Date    HYSTERECTOMY      WISDOM TOOTH EXTRACTION          Hysterectomy: Yes   Date: .  Indication: fibroid.    Type: vaginal  Cervix present: No  Ovaries present: No  Other procedures at time of hysterectomy:  incidental cystotomy (fibroid attached to that)    Past Ob History     x 3.  C/s x 0.    Largest infant weight: 9#11oz.   yes FAVD. yes episiotomy.      Gynecologic History  LMP: No LMP recorded. Patient has had a hysterectomy.  Age of menarche: 12 yo  Age of menopause: with TVH  Menstrual history: h/o menorrhagia  Pap test: post hyst.  History of abnormal paps: No.  History of STIs:  No  Mammogram: Date of last: .  Result: Normal per report ([facility]).   Colonoscopy: Date of last: .  Result:  normal per report ([facility]).  Repeat  due:  2027.    DEXA: none    Family History  Family History   Problem Relation Age of Onset    Hyperlipidemia Father     Progressive Supranuclear Palsy Father     Breast cancer Mother     Colon cancer Mother     Breast cancer Maternal Aunt     Diabetes Neg Hx     Hypertension Neg Hx     Ovarian cancer Neg Hx     Stroke Neg Hx       Colon CA: Yes - mother; +polyps father  Breast CA: Yes - mother (2 separate cancers)  GYN CA: No   CA: No    Social History  Social History     Tobacco Use   Smoking Status Never   Smokeless Tobacco Never     Social History     Substance and Sexual Activity   Alcohol Use Yes    Comment: occasional    .    Social History     Substance and Sexual Activity   Drug Use Never   .  The patient is .  Resides in [place] [address].  Employment status: retired .     Allergies  Review of patient's allergies indicates:  No Known Allergies    Medications  Current Outpatient Medications on File Prior to Visit   Medication Sig Dispense Refill    cholecalciferol, vitamin D3, (VITAMIN D3 ORAL) Take by mouth.      cyanocobalamin, vitamin B-12, (VITAMIN B-12 ORAL) Take by mouth.      cyclobenzaprine HCl (FLEXERIL ORAL) Take by mouth.      diphenhydramine HCl (BENADRYL ORAL) Take by mouth.      estradioL (ESTRACE) 0.01 % (0.1 mg/gram) vaginal cream Insert 0.5 gms per vagina qhs x 2 weeks then twice weekly. 42.5 g 0    fluticasone propionate (FLONASE) 50 mcg/actuation nasal spray fluticasone propionate 50 mcg/actuation nasal spray,suspension      green tea leaf extract (GREEN TEA ORAL) Take by mouth.      magnesium oxide (MAGOX ORAL) Take 400 mg by mouth.      metoprolol tartrate (LOPRESSOR) 25 MG tablet Take 25 mg by mouth 2 (two) times daily.      MULTIVITAMIN ORAL Multivitamins      mupirocin (BACTROBAN) 2 % ointment mupirocin 2 % topical ointment      plant stanol ernei (CHOLEST OFF ORAL) Take by mouth.      RABEprazole (ACIPHEX) 20 mg tablet rabeprazole 20 mg tablet,delayed  "release      rosuvastatin (CRESTOR) 5 MG tablet Take 1 tablet by mouth once daily.      rosuvastatin (CRESTOR) 5 MG tablet Take 5 mg by mouth once daily.       No current facility-administered medications on file prior to visit.       Review of Systems A 14 point ROS was reviewed with pertinent positives as noted above in the history of present illness.      Constitutional: negative  Eyes: negative  Endocrine: negative  Gastrointestinal: negative  Cardiovascular: negative  Respiratory: negative  Allergic/Immunologic: negative  Integumentary: negative  Psychiatric: negative  Musculoskeletal: negative   Ear/Nose/Throat: negative  Neurologic: negative  Genitourinary: SEE HPI  Hematologic/Lymphatic: negative   Breast: negative    Urogynecologic Exam  /63 (BP Location: Left arm, Patient Position: Sitting)   Pulse 76   Resp 19   Ht 5' 7" (1.702 m)   Wt 84 kg (185 lb 3.2 oz)   SpO2 (!) 90%   BMI 29.01 kg/m²     GENERAL APPEARANCE:  The patient is well-developed, well-nourished.  Neck:  Supple with no thyromegaly, no carotid bruits.  Heart:  Regular rate and rhythm, no murmurs, rubs or gallops.  Lungs:  Clear.  No CVA tenderness.  Abdomen:  Soft, nontender, nondistended, no hepatosplenomegaly.  Incisions:  none    PELVIC:    External genitalia:  Normal Bartholins, Skenes and labia bilaterally.  +hypopigmentation at perineal body concerning for lichen process.    Urethra:  No caruncle, diverticulum or masses.  (+) hypermobility.    Vagina:  Atrophy (+) , no bladder masses or tender, no discharge.    Cervix:  absent  Uterus: uterus absent  Adnexa: Not palpable.    POP-Q:  Aa 0; Ba 0; C -8; Ap 0; Bp 0.  Genital hiatus 4, perineal body 2, total vaginal length 9-10.    NEUROLOGIC:  Cranial nerves 2 through 12 intact.  Strength 5/5.  DTRs 2+ lower extremities.  S2 through 4 normal.  Sacral reflexes intact.    EXT: LEVI, 2+ pulses bilaterally, no C/C/E    COUGH STRESS TEST:  negative  KEGEL: 1 /5    RECTAL:    External: "  Normal, (--) hemorrhoids, (--) dovetailing.   Internal:   per CRS    PVR: 30 mL    Impression    1. Family history of colon cancer    2. Family history of breast cancer    3. Lichen sclerosus    4. Incomplete defecation    5. Urinary incontinence, mixed    6. Chronic constipation    7. Hemorrhoids, unspecified hemorrhoid type    8. Cystocele, midline    9. Rectocele        Initial Plan  The patient was counseled regarding these issues. The patient was given a summary sheet containing each of these issues with possible options for evaluation and management. When appropriate, we also reviewed computer-generated diagrams specific to their diagnoses..  All questions were addressed to the patient's satisfaction.    1)  Mixed urinary incontinence, urge < stress:    --urine C&S  --Empty bladder every 3 hours.  Empty well: wait a minute, lean forward on toilet.    --Avoid dietary irritants (see sheet).  Keep diary x 3-5 days to determine your irritants.  METAHANSAE/MICHELLE:  Krystyna Goodman, PT, DPT or Marcy Rico PT, DPT ([employer] [employer] [facility] [employer]): (p) [phone].  (f) [phone].  --EARLY/LATE APPT OPTIONS:   TU, TH, FRI: 620 AM 1st APPT    M-TH (EVERY DAY BUT FRI): 520 PM LAST APPT    --URGE: consider medication in future. Takes 2-4 weeks to see if will have effect.  For dry mouth: get sour, sugar free lozenge or gum.    --STRESS:  Pessary vs. Sling.     2)  Concern for lichen sclerosis:  --EVERY AM apply steroid ointment/cream:  Apply dime-sized amount with finger to vaginal opening, inner lips, and all external areas (including around anus) that are irritated.  DO NOT APPLY INTERNALLY.   --EVERY PM apply estrogen cream.  Apply dime-sized amount with finger to vaginal opening, inner lips, and all external areas (including around anus) that are irritated. Also apply small amount inside vagina with finger (insert to knuckle).    3)  Constipation with incomplete defecation, +hemorrhoids:  --hydrate well: 6-8  glasses/day (approx 64 oz)  Controlling constipation may help bladder urgency/leakage and fiber may better control cholesterol and blood glucose.  Start daily fiber.  Take 1 tsp of fiber powder (methylcellulose [citrucel] or other sugar-free powder).  Mix in 8 oz of water.  Take x 3-5 days.  Then, increase fiber by 1 tsp every 3-5 days until stool is easy to pass, less incomplete defecation.  Stop and continue at that dose.   Do not exceed 6 tsps/day.  May also use over the counter stool softener 1-2 x/day.  AVOID laxatives.    4)  Stage 2 rectocele/cystocele:  --discussed  --observation for now  --work on getting stool consistency better 1st  --don't sit on toilet more than 5 minutes  --consider surgery (A&P repair) if not improving  --start PT    5)  FH breast and colon cancer:  --genetics consult    6)  RTC 3 months.     Approximately 60 min were spent in consult, 90 % in discussion.     Thank you for requesting consultation of your patient.  I look forward to participating in their care.    Franc Macias  Female Pelvic Medicine and Reconstructive Surgery  [facility] [place], [place]

## 2023-01-05 ENCOUNTER — PATIENT MESSAGE (OUTPATIENT)
Dept: HEMATOLOGY/ONCOLOGY | Facility: CLINIC | Age: 63
End: 2023-01-05
Payer: COMMERCIAL

## 2023-01-05 LAB — BACTERIA UR CULT: NO GROWTH

## 2023-02-20 PROBLEM — Z00.00 WELLNESS EXAMINATION: Status: RESOLVED | Noted: 2022-11-17 | Resolved: 2023-02-20

## 2023-02-20 PROBLEM — I21.9 MYOCARDIAL INFARCTION: Status: RESOLVED | Noted: 2020-11-15 | Resolved: 2023-02-20

## 2023-07-26 ENCOUNTER — OFFICE VISIT (OUTPATIENT)
Dept: UROGYNECOLOGY | Facility: CLINIC | Age: 63
End: 2023-07-26
Payer: COMMERCIAL

## 2023-07-26 VITALS
SYSTOLIC BLOOD PRESSURE: 118 MMHG | WEIGHT: 178.56 LBS | DIASTOLIC BLOOD PRESSURE: 80 MMHG | BODY MASS INDEX: 27.97 KG/M2

## 2023-07-26 DIAGNOSIS — Z01.419 ENCOUNTER FOR GYNECOLOGICAL EXAMINATION: ICD-10-CM

## 2023-07-26 DIAGNOSIS — Z80.0 FAMILY HISTORY OF COLON CANCER: ICD-10-CM

## 2023-07-26 DIAGNOSIS — L90.0 LICHEN SCLEROSUS: ICD-10-CM

## 2023-07-26 DIAGNOSIS — K59.09 CHRONIC CONSTIPATION: ICD-10-CM

## 2023-07-26 DIAGNOSIS — N81.6 RECTOCELE: ICD-10-CM

## 2023-07-26 DIAGNOSIS — R15.0 INCOMPLETE DEFECATION: ICD-10-CM

## 2023-07-26 DIAGNOSIS — N39.46 URINARY INCONTINENCE, MIXED: ICD-10-CM

## 2023-07-26 DIAGNOSIS — N95.2 VAGINAL ATROPHY: Primary | ICD-10-CM

## 2023-07-26 DIAGNOSIS — N95.2 VAGINAL ATROPHY: ICD-10-CM

## 2023-07-26 DIAGNOSIS — N81.11 CYSTOCELE, MIDLINE: ICD-10-CM

## 2023-07-26 DIAGNOSIS — Z80.3 FAMILY HISTORY OF BREAST CANCER: ICD-10-CM

## 2023-07-26 PROCEDURE — 99999 PR PBB SHADOW E&M-EST. PATIENT-LVL IV: CPT | Mod: PBBFAC,,, | Performed by: OBSTETRICS & GYNECOLOGY

## 2023-07-26 PROCEDURE — 99214 OFFICE O/P EST MOD 30 MIN: CPT | Mod: S$GLB,,, | Performed by: OBSTETRICS & GYNECOLOGY

## 2023-07-26 PROCEDURE — 99214 PR OFFICE/OUTPT VISIT, EST, LEVL IV, 30-39 MIN: ICD-10-PCS | Mod: S$GLB,,, | Performed by: OBSTETRICS & GYNECOLOGY

## 2023-07-26 PROCEDURE — 99999 PR PBB SHADOW E&M-EST. PATIENT-LVL IV: ICD-10-PCS | Mod: PBBFAC,,, | Performed by: OBSTETRICS & GYNECOLOGY

## 2023-07-26 RX ORDER — AZITHROMYCIN 250 MG/1
TABLET, FILM COATED ORAL
COMMUNITY
Start: 2023-02-01

## 2023-07-26 RX ORDER — ATORVASTATIN CALCIUM 10 MG/1
1 TABLET, FILM COATED ORAL DAILY
COMMUNITY
Start: 2023-07-07 | End: 2023-10-02

## 2023-07-26 RX ORDER — ATORVASTATIN CALCIUM 10 MG/1
10 TABLET, FILM COATED ORAL
COMMUNITY
Start: 2023-07-07

## 2023-07-26 RX ORDER — ESTRADIOL 0.1 MG/G
0.5 CREAM VAGINAL
Qty: 42.5 G | Refills: 0 | Status: SHIPPED | OUTPATIENT
Start: 2023-07-26 | End: 2023-07-27

## 2023-07-26 RX ORDER — BENZONATATE 100 MG/1
100 CAPSULE ORAL 3 TIMES DAILY
COMMUNITY
Start: 2023-02-01

## 2023-07-26 NOTE — PATIENT INSTRUCTIONS
1)  Mixed urinary incontinence, urge < stress:    --Empty bladder every 3 hours.  Empty well: wait a minute, lean forward on toilet.    --Avoid dietary irritants (see sheet).  Keep diary x 3-5 days to determine your irritants.  --continue pelvic floor PT exercises at home  --URGE: consider medication in future. Takes 2-4 weeks to see if will have effect.  For dry mouth: get sour, sugar free lozenge or gum.    --STRESS:  Pessary vs. Sling.     2)  Concern for lichen sclerosis:  --THREE TIMES A WEEK AT NIGHT apply estrogen cream.  Apply dime-sized amount with finger to vaginal opening, inner lips, and all external areas (including around anus) that are irritated. Also apply small amount inside vagina with finger (insert to knuckle).  --stop steroid    3)  Constipation with incomplete defecation, +hemorrhoids:  --hydrate well: 6-8 glasses/day (approx 64 oz)  --take magnesium oxide 400 mg daily or supplement like calm  --continue daily fiber  --try to add 1-2 prunes or warm prune juice (1/4 cup)  --keep food diary and avoid triggers    4)  Stage 2 rectocele/cystocele:  --discussed  --observation for now  --work on getting stool consistency better 1st  --don't sit on toilet more than 5 minutes  --consider surgery (A&P repair) if not improving  --continue PT    5)  FH breast and colon cancer:  --genetics consult--call to make appt    6)  RTC 6 months.

## 2023-07-26 NOTE — PROGRESS NOTES
Urogyn follow up  2023    [facility]  [address]  [facility] [address]    Cherry Kaiser  [id number]  1960    Cherry Kaiser is a 63 y.o. here for a urogyn follow up.  The patient's last visit with me was on 2023.    1)  UI:  (+) HAYLIE (full bladder)  > (+) UUI (rare).  (--) pads. Daytime frequency: Q 3 hours.  Nocturia: No. (--) dysuria,  (--) hematuria,  (--) frequent UTIs.  (+) complete bladder emptying.     2)  POP:  Present--saw something in mirror when applying vaginal estrogen for lichen. Symptoms: (--).  (--) vaginal bleeding. (--) vaginal discharge. (+) sexually active.  (--) dyspareunia.  (+)  Vaginal dryness.  (+) vaginal estrogen use.   --POP-Q:  Aa 0; Ba 0; C -8; Ap 0; Bp 0.  Genital hiatus 4, perineal body 2, total vaginal length 9-10.    3)  BM:  (+) constipation/straining. Takes fish oil, magnesium PRN. Fiber bulked too much.  (--) chronic diarrhea. (--) hematochezia. +BRB on wiping with hemorrhoids.  (--) fecal incontinence.  (--) fecal smearing/urgency.  (+)/-- complete evacuation.      Past Medical History  Past Medical History:   Diagnosis Date    Anemia     Heart attack     Hyperlipidemia         Past Surgical History  Past Surgical History:   Procedure Laterality Date    HYSTERECTOMY      WISDOM TOOTH EXTRACTION          Hysterectomy: Yes   Date: .  Indication: fibroid.    Type: vaginal  Cervix present: No  Ovaries present: No  Other procedures at time of hysterectomy:  incidental cystotomy (fibroid attached to that)    Past Ob History     x 3.  C/s x 0.    Largest infant weight: 9#11oz.   yes FAVD. yes episiotomy.      Gynecologic History  LMP: No LMP recorded. Patient has had a hysterectomy.  Age of menarche: 12 yo  Age of menopause: with TVH  Menstrual history: h/o menorrhagia  Pap test: post hyst.  History of abnormal paps: No.  History of STIs:  No  Mammogram: Date of last: .  Result: Normal per report ([facility]).    Colonoscopy: Date of last: 2022.  Result:  normal per report ([facility]).  Repeat due:  2027.    DEXA: none    Issues include:  Patient Active Problem List   Diagnosis    Tendonitis, Achilles, right    Posterior tibial tendinitis of right leg    Chronic pain of right ankle    Uterine leiomyoma    Abnormal electrocardiography    Arteriosclerosis of coronary artery    Diverticular disease    Gastroesophageal reflux disease    Hyperlipidemia    Palpitations    Spasm of back muscles    Family history of colon cancer    Family history of breast cancer    Lichen sclerosus    Incomplete defecation    Urinary incontinence, mixed    Rectocele    Cystocele, midline    Hemorrhoids    Chronic constipation    Vaginal atrophy       History since last visit:     1)  Mixed urinary incontinence, urge < stress:    --baseline:  (+) HAYLIE (full bladder)  > (+) UUI (rare).  (--) pads. Daytime frequency: Q 3 hours.  --currently: no major issues; no pad use  --has been going to PT    2)  Concern for lichen sclerosis:  --used steroid + estrogen daily x 2 months  --now just using estrogen cream 2x/month    3)  Constipation with incomplete defecation, +hemorrhoids:  --still having some irregularity   --taking metamucil--1 tsp--states wasn't better with increase in dose  --drinks 6 x 8oz glasses of water/day  --has not noted dietary triggers (h/o diverticulitis)  --does have to strain at times but is working with PT    4)  Stage 2 rectocele/cystocele:  --no major symptoms    5)  FH breast and colon cancer:  --did not do full genetics consult    Medications:    Current Outpatient Medications:     atorvastatin (LIPITOR) 10 MG tablet, Take 10 mg by mouth., Disp: , Rfl:     atorvastatin (LIPITOR) 10 MG tablet, Take 1 tablet by mouth once daily., Disp: , Rfl:     cholecalciferol, vitamin D3, (VITAMIN D3 ORAL), Take by mouth., Disp: , Rfl:     cyclobenzaprine HCl (FLEXERIL ORAL), Take by mouth., Disp: , Rfl:     diphenhydramine HCl (BENADRYL ORAL),  Take by mouth., Disp: , Rfl:     green tea leaf extract (GREEN TEA ORAL), Take by mouth., Disp: , Rfl:     RABEprazole (ACIPHEX) 20 mg tablet, rabeprazole 20 mg tablet,delayed release, Disp: , Rfl:     azithromycin (Z-HEMA) 250 MG tablet, Take by mouth., Disp: , Rfl:     benzonatate (TESSALON) 100 MG capsule, Take 100 mg by mouth 3 (three) times daily., Disp: , Rfl:     clobetasol 0.05% (TEMOVATE) 0.05 % Oint, Apply topically once daily. (Patient not taking: Reported on 7/26/2023), Disp: 30 g, Rfl: 3    cyanocobalamin, vitamin B-12, (VITAMIN B-12 ORAL), Take by mouth., Disp: , Rfl:     estradioL (ESTRACE) 0.01 % (0.1 mg/gram) vaginal cream, Place 0.5 g vaginally 3 (three) times a week. Insert 0.5 gms per vagina qhs x 2 weeks then twice weekly., Disp: 42.5 g, Rfl: 0    fluticasone propionate (FLONASE) 50 mcg/actuation nasal spray, fluticasone propionate 50 mcg/actuation nasal spray,suspension, Disp: , Rfl:     magnesium oxide (MAGOX ORAL), Take 400 mg by mouth., Disp: , Rfl:     metoprolol tartrate (LOPRESSOR) 25 MG tablet, Take 25 mg by mouth 2 (two) times daily., Disp: , Rfl:     MULTIVITAMIN ORAL, Multivitamins, Disp: , Rfl:     mupirocin (BACTROBAN) 2 % ointment, mupirocin 2 % topical ointment, Disp: , Rfl:     plant stanol ernie (CHOLEST OFF ORAL), Take by mouth., Disp: , Rfl:     rosuvastatin (CRESTOR) 5 MG tablet, Take 1 tablet by mouth once daily., Disp: , Rfl:     rosuvastatin (CRESTOR) 5 MG tablet, Take 5 mg by mouth once daily., Disp: , Rfl:     ROS:  As per HPI.      Exam  /80 (BP Location: Right arm, Patient Position: Sitting, BP Method: Medium (Manual))   Wt 81 kg (178 lb 9.2 oz)   BMI 27.97 kg/m²   General: alert and oriented, no acute distress  Respiratory: normal respiratory effort  Abd: soft, non-tender, non-distended    PELVIC:    External genitalia:  Normal Bartholins, Skenes and labia bilaterally.  +mild erythema at B labia minora, inner. No focal lesions/hypopigmented.   Urethra:  No  caruncle, diverticulum or masses.  (+) hypermobility.    Vagina:  Atrophy (+) , no bladder masses or tender, no discharge.    Cervix:  absent  Uterus: uterus absent  Adnexa: Not palpable.    POP-Q:  Aa 0; Ba 0; C -8; Ap 0; Bp 0.  Genital hiatus 4, perineal body 2, total vaginal length 9-10.    Impression  1. Vaginal atrophy  estradioL (ESTRACE) 0.01 % (0.1 mg/gram) vaginal cream      2. Encounter for gynecological examination  estradioL (ESTRACE) 0.01 % (0.1 mg/gram) vaginal cream      3. Family history of colon cancer        4. Family history of breast cancer        5. Lichen sclerosus        6. Incomplete defecation        7. Urinary incontinence, mixed        8. Chronic constipation        9. Rectocele        10. Cystocele, midline          We reviewed the above issues and discussed options for short-term versus long-term management of her problems.   Plan:   1)  Mixed urinary incontinence, urge < stress:    --Empty bladder every 3 hours.  Empty well: wait a minute, lean forward on toilet.    --Avoid dietary irritants (see sheet).  Keep diary x 3-5 days to determine your irritants.  --continue pelvic floor PT exercises at home  --URGE: consider medication in future. Takes 2-4 weeks to see if will have effect.  For dry mouth: get sour, sugar free lozenge or gum.    --STRESS:  Pessary vs. Sling.     2)  Concern for lichen sclerosis:  --THREE TIMES A WEEK AT NIGHT apply estrogen cream.  Apply dime-sized amount with finger to vaginal opening, inner lips, and all external areas (including around anus) that are irritated. Also apply small amount inside vagina with finger (insert to knuckle).  --stop steroid    3)  Constipation with incomplete defecation, +hemorrhoids:  --hydrate well: 6-8 glasses/day (approx 64 oz)  --take magnesium oxide 400 mg daily or supplement like calm  --continue daily fiber  --try to add 1-2 prunes or warm prune juice (1/4 cup)  --keep food diary and avoid triggers    4)  Stage 2  rectocele/cystocele:  --discussed  --observation for now  --work on getting stool consistency better 1st  --don't sit on toilet more than 5 minutes  --consider surgery (A&P repair) if not improving  --continue PT    5)  FH breast and colon cancer:  --genetics consult--call to make appt    6)  RTC 6 months.     30 minutes were spent in face to face time with this patient  90 % of this time was spent in counseling and/or coordination of care     Franc Macias MD  [facility]  Division of Female Pelvic Medicine and Reconstructive Surgery  Department of Obstetrics & Gynecology

## 2023-07-27 RX ORDER — ESTRADIOL 0.1 MG/G
0.5 CREAM VAGINAL
Qty: 42.5 G | Refills: 4 | Status: SHIPPED | OUTPATIENT
Start: 2023-07-28

## 2023-07-27 NOTE — TELEPHONE ENCOUNTER
Refill Routing Note   Medication(s) are not appropriate for processing by [facility] for the following reason(s):      Clarification of medication (Rx) details    [facility] action(s):  Defer Care Due:  None identified   Medication Therapy Plan: Please clarify directions. Is pt inserting 0.5gms vaginally once, tiwce, or 3 times weekly? MD Please advise      Appointments  past 12m or future 3m with PCP    Date Provider   Last Visit   7/26/2023 Franc Macias MD   Next Visit   Visit date not found Franc Macias MD   ED visits in past 90 days: 0        Note composed:10:20 PM 07/26/2023

## 2023-09-13 ENCOUNTER — TELEPHONE (OUTPATIENT)
Dept: HEMATOLOGY/ONCOLOGY | Facility: CLINIC | Age: 63
End: 2023-09-13
Payer: COMMERCIAL

## 2023-09-13 NOTE — TELEPHONE ENCOUNTER
Consult scheduled Monday, 9/18 at 8:30am with Cleo. Confirmed location and appt details. She voiced thanks and understanding.    ----- Message from Shaneka Solomon sent at 9/12/2023  9:34 AM CDT -----  Pt requesting appt from a referral for the following genetics.... Please call and adv @ [phone]

## 2023-09-18 ENCOUNTER — LAB VISIT (OUTPATIENT)
Dept: LAB | Facility: HOSPITAL | Age: 63
End: 2023-09-18
Attending: INTERNAL MEDICINE
Payer: COMMERCIAL

## 2023-09-18 ENCOUNTER — OFFICE VISIT (OUTPATIENT)
Dept: HEMATOLOGY/ONCOLOGY | Facility: CLINIC | Age: 63
End: 2023-09-18
Payer: COMMERCIAL

## 2023-09-18 DIAGNOSIS — Z80.3 FAMILY HISTORY OF BREAST CANCER: ICD-10-CM

## 2023-09-18 DIAGNOSIS — Z80.42 FAMILY HISTORY OF PROSTATE CANCER: ICD-10-CM

## 2023-09-18 DIAGNOSIS — Z80.42 FAMILY HISTORY OF PROSTATE CANCER: Primary | ICD-10-CM

## 2023-09-18 DIAGNOSIS — Z80.0 FAMILY HISTORY OF COLON CANCER: ICD-10-CM

## 2023-09-18 PROCEDURE — 99499 UNLISTED E&M SERVICE: CPT | Mod: S$GLB,,, | Performed by: GENETIC COUNSELOR, MS

## 2023-09-18 PROCEDURE — 96040 PR GENETIC COUNSELING, EACH 30 MIN: ICD-10-PCS | Mod: S$GLB,,, | Performed by: GENETIC COUNSELOR, MS

## 2023-09-18 PROCEDURE — 96040 PR GENETIC COUNSELING, EACH 30 MIN: CPT | Mod: S$GLB,,, | Performed by: GENETIC COUNSELOR, MS

## 2023-09-18 PROCEDURE — 99999 PR PBB SHADOW E&M-EST. PATIENT-LVL III: CPT | Mod: PBBFAC,,, | Performed by: GENETIC COUNSELOR, MS

## 2023-09-18 PROCEDURE — 99499 NO LOS: ICD-10-PCS | Mod: S$GLB,,, | Performed by: GENETIC COUNSELOR, MS

## 2023-09-18 PROCEDURE — 99999 PR PBB SHADOW E&M-EST. PATIENT-LVL III: ICD-10-PCS | Mod: PBBFAC,,, | Performed by: GENETIC COUNSELOR, MS

## 2023-09-18 PROCEDURE — 36415 COLL VENOUS BLD VENIPUNCTURE: CPT | Performed by: INTERNAL MEDICINE

## 2023-09-18 NOTE — PROGRESS NOTES
"Cancer Genetics  Hereditary and High-Risk Clinic  Department of Hematology and Oncology  [facility]    [facility]    Date of Service:  23  Visit Provider:  Cleo Rosario, [facility] [place], [facility]    Patient ID  Name: Cherry Kaiser    : 1960    MRN: [id number]      Referring Provider  Franc Macias MD  [address] [place] [address]    IMPRESSION     Cherry is a 64yo female with a maternal family history of breast, colon, prostate and other cancers, for which she meets NCCN criteria for genetic testing. While negative results in her would be uninformative, her mother and most other affected relatives are , so genetic testing is still indicated for Cherry. A sample was submitted on 23 to [facility] for CancerNext +RNAinsight panel testing, along with informed consent and insurance information.    FOCUSED PERSONAL HISTORY     Chief Complaint: Genetic Evaluation (Family history of breast and colon cancer)    History of Present Illness (HPI):  Cherry Kaiser ("Cherry"), 63 y.o., assigned female sex at birth, is new to the [facility] and to me.  She was referred by  Dr. Macias from Urogynecology  for hereditary cancer risk assessment given her maternal family history of breast, colon, prostate and other cancers. She reports that she has undergone three colonoscopies, the first of which at ~50yo identified one polyp (unknown pathology), and the second of which at ~55yo was normal. The third is detailed below.     -EGD and Colonoscopy on 22:   Normal esophagus and duodenum  Diverticulosis in the ascending, descending and sigmoid colon  1 tubular adenoma (5mm) in the transverse colon    She also reported undergoing hysterectomy (ovaries remain) in 2012 due to abnormal uterine bleeding with fibroids. She also undergoes annual mammogram, with her most recent detailed below.    -Mammogram on 22:  A dominant mass " "in the upper outer quadrant of the left breast is markedly decreased in size in the interval. There are scattered punctate calcifications in the subareolar region bilaterally, similar to the previous exam. There are no new suspicious masses, calcifications or areas of architectural distortion detected. (BIRADS 2)    Breast Cancer Risk Assessment Questionnaire  Age:  63 y.o.   Race/ethnicity:  White/Not  or /a  Weight:  178 lb  Height:  5'7"  Mammographic breast density:  scattered fibroglandular densities  Age at menarche:  14y  Age at first live childbirth:  20y  Menopausal status:  postmenopausal  Age at menopause, if applicable:  58y  Hormone replacement therapy use history:  Yes, estrace 3x/week  Breast biopsy history and findings:  None.  Thoracic radiation therapy history:  None.  Breast cancer susceptibility gene testing history:  Daughter reported had negative testing (panel?)  Ashkenazi Sabianism ancestry:  See below  Family history of breast cancer, ovarian cancer, and genetic testing:  See below    Tobacco Use  Tobacco Use: Low Risk  (2023)    Patient History     Smoking Tobacco Use: Never     Smokeless Tobacco Use: Never     Passive Exposure: Not on file     Review of Systems   Patient's Distress Score today was 5/10 (with 10 being the worst).  Patient attributes this to typical stresses of being a parent.  Patient denies experiencing suicidal or homicidal ideations (SI/HI).  Offered patient a referral to [facility], and patient declined.      FAMILY HISTORY         Cherry reported her family history of cancer as follows:  Mother: diagnosed with colon cancer at 68yo and then metastatic breast cancer at 71yo and  at 71yo  Maternal aunt: 79yo who was diagnosed with breast cancer at ~54yo; no known genetic testing  Maternal uncle: diagnosed with thyroid cancer in her 50s, them prostate cancer in his 60s and leukemia in his 60s (which caused his death)  Maternal " grandmother: diagnosed with lung cancer and  at 91yo  Maternal great uncle (grandmother's brother): diagnosed with stomach cancer and  in his 60s  Maternal great uncle (grandmother's brother): diagnosed with colon cancer and  in his 70s  Maternal great uncle (grandmother's brother): diagnosed with leukemia and  in his 70s    A family history of birth defects, intellectual disability, SIDS, sudden early death, multiple miscarriages and consanguinity were denied. Please refer to above pedigree for further details. A larger copy has been scanned in the Media tab.     DISCUSSION      MEETS CRITERIA AND PURSUING TESTING    Approximately 5-10% of breast cancers are due to hereditary causes. The majority of hereditary breast cancers (>50%) are due to mutations in BRCA1 or BRCA2.  Around 12-30% are due to mutations in other highly penetrant genes, such as PALB2, PTEN and TP53, or in moderately penetrant genes, such as RANJANA, BARD1, and CHEK2.  The remaining percentage are caused by unknown/unidentified genes. Cherry meets NCCN criteria for genetic testing based on her maternal family history of breast and prostate cancers (3 affected close relatives). She also has a maternal family history of colon, thyroid and other cancers. Therefore, she was offered phenotype-driven and broad panel testing. Cherry opted for the [facility] panel through [employer] of the following 36 genes associated with hereditary breast, gastrointestinal, gynecologic, pancreatic, prostate, skin and other cancers:    APC, RANJANA, AXIN2, BARD1, BMPR1A, BRCA1, BRCA2, BRIP1, CDH1, CDK4, CDKN2A, CHEK2, DICER1, EPCAM, GREM1, HOXB13, MLH1, MSH2, MSH3, MSH6, MUTYH, NBN, NF1, NTHL1, PALB2, PMS2, POLD1, POLE, PTEN, RAD51C, RAD51D, RECQL, SMAD4, SMARCA4, STK11, TP53    We reviewed that mutations in the highly penetrant genes put an individual at a significantly increased risk of breast, colon and/or other cancers.  There are  established screening and surgery guidelines for these syndromes. Mutations in the moderately penetrant genes increase the risk of breast, colon and/or other cancers, but less is understood regarding their role in cancer risk. There may not be standard screening or management guidelines for individuals who have mutations in these genes. Furthermore, we discussed the psychosocial implications of a positive result, including anxiety, fear and guilt if a mutation is passed on to a child. Cherry did not express concern and stated that .    We also discussed that it is typically preferred to offer genetic testing to someone in the family who has a history of a suspicious cancer, as their test results can be more informative. If they were to test negative, then we would have ruled out the known forms of hereditary breast cancer. If they were to test positive, then we would know that their cancer history was caused by a hereditary mutation and other individuals in the family could pursue predictive testing. When testing is offered to an individual without a personal history of cancer and they test negative, we do not know if that is because the cancer in the family was caused by a hereditary mutation and the patient did not inherit it, or if the cancer in the family was secondary to something aside from the aforementioned genes.     In Cherry's family, the most appropriate individual to undergo genetic testing would be her mother or other affected relatives. Unfortunately, most of them, including Cherry's mother, are . We discussed that Cherry could undergo genetic testing, with the limitation that a negative result will not provide any further information regarding her family members' genetic status.     If a genetic mutation is identified in Cherry, then her family members could consider undergoing predictive genetic testing to determine if they inherited the familial mutation.  Each first degree  relative (parents, siblings, children) has a 50% chance to have the familial mutation.     If no genetic mutation is identified in Cherry, then we are still unsure as to what caused her family history of breast, colon and other cancers.  Other individuals in the family, specifically her maternal aunt who had breast cancer and her siblings, should also consider undergoing genetic testing.  Until the cause of the her cancer family history can be established, Cherry may be at an increased risk of developing cancer in her lifetime.  We may never be able to establish the cause of her family history of cancer.     Breast Cancer Risk Stratification   Current, Estimated Breast Cancer Risk Model Used Patient's Score Patient's Risk Category   5-year Megan Model 2.8% (vs 1.8%)  [] N/A given age <35   [] Average risk (<1.7%)   [x] Increased risk (?1.7%)   10-year Tyrer-Cuzick v8.0b 6.3% (vs 3.4%)  [] <5%   [x] ?5%    Lifetime (to age 85) Tyrer-Cuzick v8.0b 12.6% (vs 7.0%)  [x] Average risk (<15%)   [] Intermediate risk (?15% - <20%)   [] Increased risk (?20%)     There are differences between these models and how her personal and family history affects these risks. Due to her >5% 10-year risk to develop breast cancer, she may be eligible for breast MRI if her genetic testing reveals no actionable mutations.     The potential outcomes of testing, including the high VUS (variant of unknown significance) rate seen in panel testing, were reviewed and implications were discussed. There is also a possibility for the patient to incur out-of-pocket costs related to this testing. Issues regarding insurance discrimination were discussed. RENNY protects against employment and health insurance discrimination, but it does not apply to life insurance, long term care or disability policies. It also does not apply to  personnel or employers with less than 15 employees. Cherry appeared to have a good understanding of the information  "as she asked appropriate questions.  A sample was submitted on 23 to [facility] .  Cherry's results should be available in approximately 3 weeks.  In the meantime, she is welcome to contact me if she has any questions, concerns, or updates to her family history.     Cherry received comprehensive counseling regarding panel testing and has elected to proceed with this testing.     ASSESSMENT / PLAN      Cherry Kaiser ("Cherry"), 63 y.o., presented today for hereditary cancer risk assessment and genetic counseling given her maternal family history of breast, colon, prostate and other cancers, for which she meets NCCN criteria for genetic testing. While negative results in her would leave the family history of cancer unexplained, most of her affected relatives (including her mother) are , so genetic testing is still indicated for Cherry. A sample was submitted to [facility], and I will contact Cherry once her results are available. A follow-up genetic counseling appointment will be scheduled if results are positive for a pathogenic mutation.        ICD-10-CM ICD-9-CM   1. Family history of prostate cancer  Z80.42 V16.42   2. Family history of colon cancer  Z80.0 V16.0   3. Family history of breast cancer  Z80.3 V16.3     1. Family history of colon cancer  - Ambulatory referral/consult to Genetics  - Genetic Misc Sendout Test, Blood; Future    2. Family history of breast cancer  - Ambulatory referral/consult to Genetics  - Genetic Misc Sendout Test, Blood; Future    3. Family history of prostate cancer  - Genetic Misc Sendout Test, Blood; Future       Genetic Test Information  Testing lab: [facility]   Test panel: CancerNext +RNAinsight  (Core:  BRCA1/BRCA2)   ICD-10 code(s): Z80.3, Z80.42, Z80.0   Verbal informed consent: Obtained   Written informed consent: Obtained   Specimen type: Blood  (Patient denies blood disorders that would necessitate a skin fibroblast specimen) "   Specimen collection by: [facility]   Specimen collection date: 09/18/2023   Results expected by: Approximately 2-3 weeks after the genetic testing lab receives the specimen   Results disclosure plan: Post-test visit if positive or complex result; otherwise, results will be communicated by phone call       Follow-up:  Follow up if genetic test results are positive for a pathogenic mutation.    Questions were encouraged and answered to the patient's satisfaction, and she verbalized understanding of the information and agreement with the plan.           Approximately 48 minutes were spent face-to-face with the patient.  Approximately 100 minutes in total were spent on this encounter, which includes face-to-face time and non-face-to-face time preparing to see the patient (e.g., review of tests), obtaining and/or reviewing separately obtained history, documenting clinical information in the electronic or other health record, independently interpreting results (not separately reported) and communicating results to the patient/family/caregiver, or care coordination (not separately reported).     This assessment is based on the history and reports provided, as well as the current scientific knowledge regarding cancer genetics.       Cleo Rosario, [facility], [facility]  Senior Genetic Counselor, Hereditary and High-Risk Clinic  Department of Hematology and Oncology  [facility]    [facility]        CC:  Dr. Franc Macias

## 2023-09-19 ENCOUNTER — DOCUMENTATION ONLY (OUTPATIENT)
Dept: HEMATOLOGY/ONCOLOGY | Facility: CLINIC | Age: 63
End: 2023-09-19
Payer: COMMERCIAL

## 2023-09-28 ENCOUNTER — TELEPHONE (OUTPATIENT)
Dept: HEMATOLOGY/ONCOLOGY | Facility: CLINIC | Age: 63
End: 2023-09-28
Payer: COMMERCIAL

## 2023-09-28 LAB
GENETIC COUNSELING?: YES
GENSO SPECIMEN TYPE: NORMAL
MISCELLANEOUS GENETIC TEST NAME: NORMAL
PARTENTAL OR SIBLING TESTING?: NO
REFERENCE LAB: NORMAL
TEST RESULT: NORMAL

## 2023-09-28 NOTE — TELEPHONE ENCOUNTER
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 We briefly discussed that these results do not impact her cancer screening recommendations, but I will see her for follow-up genetic counseling on 10/2/23 at 9:30am to discuss this with her in more detail. -Cleo Rosario, MGC, GC

## 2023-09-29 NOTE — PROGRESS NOTES
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 Cherry presents today to discuss cancer risks, management recommendations, inheritance and risks for family members. We discussed that this result does not impact her management, as she should already be undergoing colonoscopy at least every 5 years based on her mother's history (more often due to her own polyp history). She should continue to be monitored by her gastroenterologist. Additionally, she is still considered at increased risk of breast cancer given her >5% 10-year risk and will be referred to the [facility] for increased surveillance.    FOCUSED PERSONAL HISTORY     Chief Complaint: Genetic Evaluation (Monoallelic mutation of MUTYH)    History of Present Illness (HPI):  Cherry Kaiser ("Cherry"), 63 y.o., assigned female sex at birth, is returning for post-test genetic counseling.  She initially presented on 2023 after being referred by Dr. Macias from Urogynecology given her family history of breast, colon, prostate and other cancers.    She reports that she has undergone three colonoscopies, the first of which at ~52yo identified one polyp (unknown pathology), and the second of which at ~57yo was normal. The third is detailed below.     -EGD and Colonoscopy on 22:   Normal esophagus and duodenum  Diverticulosis in the ascending, " "descending and sigmoid colon  1 tubular adenoma (5mm) in the transverse colon     She also reported undergoing hysterectomy (ovaries remain) in 2012 due to abnormal uterine bleeding with fibroids. She also undergoes annual mammogram, with her most recent detailed below.     -Mammogram on 22:  A dominant mass in the upper outer quadrant of the left breast is markedly decreased in size in the interval. There are scattered punctate calcifications in the subareolar region bilaterally, similar to the previous exam. There are no new suspicious masses, calcifications or areas of architectural distortion detected. (BIRADS 2)    Breast Cancer Risk Assessment Questionnaire  Age:  63 y.o.   Race and ethnicity:  White, Not  or /a  Weight:  178 lb  Height:  5'7"  Mammographic breast density:  scattered fibroglandular densities    Age at menarche:  14y  Age at first live childbirth:  20y  Menopausal status:  postmenopausal  Age at menopause, if applicable:  58y  Hormone replacement therapy use history:  Yes, estrace cream 3x/week for ~1 year  Breast biopsy history and findings:  None.  Thoracic radiation therapy history:  None.  Breast cancer susceptibility gene testing history:  MUTYH+ (het.)  Ashkenazi Shinto ancestry:  No.  Family history of breast cancer, ovarian cancer, and genetic testing:  See below    Focused Social History  Tobacco Use: Low Risk  (2023)    Patient History     Smoking Tobacco Use: Never     Smokeless Tobacco Use: Never     Passive Exposure: Not on file     Review of Systems  Patient's Distress Score today was 2/10 (scale of 0-10 on which 10=worst).       FAMILY HISTORY         Cherry reported her family history of cancer as follows:  Mother: diagnosed with colon cancer at 68yo and then metastatic breast cancer at 69yo and  at 73yo  Maternal aunt: 79yo who was diagnosed with breast cancer at ~54yo; no known genetic testing  Maternal uncle: diagnosed with thyroid cancer in her " 50s, them prostate cancer in his 60s and leukemia in his 60s (which caused his death)  Maternal grandmother: diagnosed with lung cancer and  at 91yo  Maternal great uncle (grandmother's brother): diagnosed with stomach cancer and  in his 60s  Maternal great uncle (grandmother's brother): diagnosed with colon cancer and  in his 70s  Maternal great uncle (grandmother's brother): diagnosed with leukemia and  in his 70s    A family history of birth defects, intellectual disability, SIDS, sudden early death, multiple miscarriages and consanguinity were denied. Please refer to above pedigree for further details. A larger copy has been scanned into the Media tab.    DISCUSSION     GENETIC TEST RESULTS    Cherry had a sample submitted to [facility] on 23 for [facility] [employer] [facility] panel testing. This panel includes sequencing and/or deletion/duplication testing for the following genes known to be associated with hereditary breast, gastrointestinal, gynecologic, pancreatic, prostate and skin cancers:     APC, RANJANA, AXIN2, BARD1, BMPR1A, BRCA1, BRCA2, BRIP1, CDH1, CDK4, CDKN2A, CHEK2, DICER1, EPCAM, GREM1, HOXB13, MLH1, MSH2, MSH3, MSH6, MUTYH, NBN, NF1, NTHL1, PALB2, PMS2, POLD1, POLE, PTEN, RAD51C, RAD51D, RECQL, SMAD4, SMARCA4, STK11, TP53    The results of this testing revealed a heterozygous pathogenic mutation in MUTYH. This particular mutation is described as c.1187G>A, which results in an amino acid substitution that impacts protein function. It is a known common  mutation in certain  population and found in ~1-2% of this population. 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     Additionally, a variant of unknown significance (VUS) was identified. A VUS is a change in a gene that may or may not be related to cancer risk. However, there is not enough information available to determine if it  "is disease-causing/pathogenic ("positive") or benign ("negative").     The VUS was identified in the CHEK2 gene, pathogenic mutations in which cause an increased risk of breast, colon and prostate cancers. However, it is unknown if the particular variant found in Cherry, labeled c.1183G>C, is related to cancer risk. It has been identified in individuals with breast cancer, but also in healthy controls. Experimental studies and computer models do not agree as to whether this variant impacts protein function or is damaging. According to ClinVar, 8 other labs also classify it as a VUS. One consortium ([employer]) calls it likely pathogenic, but with no accompanying assertion criteria (only citation of a paper that tested individuals with ovarian cancer and no specific information regarding this variant could be found). Therefore, it is not recommended to alter management for Cherry or her family based on this variant at this time.     If/when this variant is reclassified, [facility] will send our office an updated report. We will release this information to Cherry when available.    CANCER RISKS     Monoallelic/heterozygous = mutation in one copy of MUTYH  Biallelic/homozygous = mutations in both copies of MUTYH     Biallelic mutations in MUTYH cause MUTYH-associated polyposis (MAP). Individuals with MAP can develop dozens (<100) of colorectal polyps, including adenomas, hyperplastic, serrated and others, and have a 70-90% lifetime risk of colorectal cancer. They also have increased risks of gastric fundic polyps (~11%) and duodenal polyps (17%-34%), with ~4% lifetime risk of duodenal cancer.  There may be additional cancer risks associated with MAP, but there is limited evidence at this time.     Individuals with a monoallelic mutation in MUTYH do not have MAP, but are carriers of MAP. They may have a 6%-13% lifetime risk of colorectal cancer, which appears to be largely dependent on family history. " Individuals with a monoallelic MUTYH mutation and a family history of colorectal cancer seem to have a higher cancer risk.      MANAGEMENT RECOMMENDATIONS     The NCCN recommends that men and women with a heterozygous/monoallelic MUTYH mutation undergo general population colorectal cancer screening, and no increased screening is warranted.     If an individual with a heterozygous/monoallelic MUTYH mutation has a first-degree relatives with colorectal cancer and/or polyposis, then screening should be based on the family history (ie colonoscopy every 5 years starting at 39yo or 10 years before the youngest age at colorectal cancer diagnosis in the family).     FAMILY MEMBERS AND INHERITANCE    We discussed how MUTYH mutations are passed through the family. Cherry's children and siblings have a 50% chance to have inherited the same MUTYH mutation identified in her. We reviewed that MUTYH mutations are typically passed down for generations, therefore Cherry inherited this from one of her parents.  Based on the family history of colon cancer on Cherry's maternal side of the family, it would appear that the MUTYH mutation was most likely inherited from the maternal grandmother (although this cannot be confirmed unless other relatives pursue testing). Therefore, it is recommended that in addition to Cherry's siblings and children, her maternal relatives consider undergoing predictive testing to determine if they inherited the familial MUTYH mutation. If a maternal relative tests positive, then it would be confirmed that it was maternally inherited.    We discussed that heterozygous MUTYH mutations are not associated with an increased risk of pediatric cancers and therefore predictive testing is not recommended until at least age 18.     Cherry received comprehensive counseling regarding her genetic test results. Benefits and limitations of genetic testing were discussed, and she was given ample opportunity to ask  questions, and all of her concerns were addressed. Cherry was provided with a paper and electronic copies of her genetic test results, along with a Family Letter. She is encouraged to contact us with any changes to her personal or family history, or if she has any questions or concerns.     ASSESSMENT / PLAN      Post-test genetic counseling was provided for Cherry as her genetic test results revealed she is a carrier of a single MUTYH mutation (c.1187G>A), which is a known  mutation in  populations. This mutation may partially explain the maternal family history of colon cancer, if was indeed maternally inherited, but it does not explain any of the other cancers in the family. Additionally, this mutation does not impact any of her management recommendations, as she should be screened based on her family history:  - Colonoscopy every 5 years or per findings (continue with her gastroenterologist)  - Increased breast cancer surveillance with breast MRI based on >5% 10-year breast cancer risk (referral to the High Risk Breast Clinic)      ICD-10-CM ICD-9-CM   1. Monoallelic mutation of MUTYH gene  Z15.89 V84.89   2. Family history of breast cancer  Z80.3 V16.3   3. Family history of colon cancer  Z80.0 V16.0     Monoallelic mutation of MUTYH gene  - Continue colonoscopy every 5 years (or per findings) with your gastroenterologist  - Family Letter provided for relatives interested in genetic testing    2. Family history of colon cancer   - see #1    3. Family history of breast cancer  - Ambulatory referral/consult to Breast Surgery   - Increased breast cancer surveillance with breast MRI      Follow-up:  Follow up if needed for MUTYH heterozygote management updates.      Approximately 60 minutes were spent face-to-face with the patient.  Approximately 100 minutes in total were spent on this encounter, which includes face-to-face time and non-face-to-face time preparing to see the patient (e.g.,  review of tests), obtaining and/or reviewing separately obtained history, documenting clinical information in the electronic or other health record, independently interpreting results (not separately reported) and communicating results to the patient/family/caregiver, or care coordination (not separately reported).     This assessment is based on the history and reports provided, as well as the current scientific knowledge regarding cancer genetics.         Cleo Rosario, [facility], [facility]  Senior Genetic Counselor, Hereditary and High-Risk Clinic  Department of Hematology and Oncology  [facility]        CC:  Dr. Franc Macias

## 2023-10-02 ENCOUNTER — OFFICE VISIT (OUTPATIENT)
Dept: HEMATOLOGY/ONCOLOGY | Facility: CLINIC | Age: 63
End: 2023-10-02
Payer: COMMERCIAL

## 2023-10-02 DIAGNOSIS — Z80.3 FAMILY HISTORY OF BREAST CANCER: ICD-10-CM

## 2023-10-02 DIAGNOSIS — Z80.0 FAMILY HISTORY OF COLON CANCER: ICD-10-CM

## 2023-10-02 DIAGNOSIS — Z15.89 MONOALLELIC MUTATION OF MUTYH GENE: Primary | ICD-10-CM

## 2023-10-02 PROCEDURE — 96040 PR GENETIC COUNSELING, EACH 30 MIN: ICD-10-PCS | Mod: S$GLB,,, | Performed by: GENETIC COUNSELOR, MS

## 2023-10-02 PROCEDURE — 96040 PR GENETIC COUNSELING, EACH 30 MIN: CPT | Mod: S$GLB,,, | Performed by: GENETIC COUNSELOR, MS

## 2023-10-02 PROCEDURE — 99499 NO LOS: ICD-10-PCS | Mod: S$GLB,,, | Performed by: GENETIC COUNSELOR, MS

## 2023-10-02 PROCEDURE — 99999 PR PBB SHADOW E&M-EST. PATIENT-LVL III: ICD-10-PCS | Mod: PBBFAC,,, | Performed by: GENETIC COUNSELOR, MS

## 2023-10-02 PROCEDURE — 99499 UNLISTED E&M SERVICE: CPT | Mod: S$GLB,,, | Performed by: GENETIC COUNSELOR, MS

## 2023-10-02 PROCEDURE — 99999 PR PBB SHADOW E&M-EST. PATIENT-LVL III: CPT | Mod: PBBFAC,,, | Performed by: GENETIC COUNSELOR, MS

## 2023-10-02 NOTE — PATIENT INSTRUCTIONS
Your results do not explain the family history of breast or other cancers, but may explain the later-onset colon cancers on your mother's side (if the MUTYH mutation was maternally inherited). Other family members may want to pursue their own genetic testing, including your siblings and aunt who had breast cancer.  Your MUTYH+ (heterzygous) results do not impact your management. You should continue to undergo colonoscopy at least every 5 years (or more often depending on polyps that are found) based on your mother's history of colon cancer.  You have been referred to the High Risk Breast Clinic for increased breast cancer surveillance with breast MRI in addition to mammogram given your >5% 10-year risk of breast cancer.  Your children and siblings have a 50% chance to have the same MUTYH mutation found in you. While this will not impact their colon cancer screening, individuals of childbearing age may want to know if they are carriers so they can have their partners tested for MUTYH mutations, too. If both parents are MUTYH carriers, they have a 25% chance with each pregnancy for the child to have MUTYH-associated polyposis. I provided you with a Family Letter to give to your relatives.

## 2023-10-02 NOTE — LETTER
[facility]  [address] [place] [address]  Phone [phone]  Fax [phone]    Dear relative of Cherry Kaiser,    Cherry recently had genetic testing that revealed a mutation in her MUTYH gene, which may be associated with an increased risk for colon cancer in some families. Since mutations are passed directly from parent to child, with each child having a 50% chance of inheriting the mutation, this MUTYH mutation could be present in Cherry's siblings, children, aunt/uncles, cousins, etc. Genetic counseling and testing is recommended to determine if you or other blood relatives of Cherry have this mutation. The genetics provider will determine if you only need testing for this MUTYH mutation, or if you should also be tested for mutations in other genes. If you only need testing for this MUTYH mutation, [facility] will test your MUTYH gene for free if your specimen is received within 90 days of Cherry's results date. Please give this letter to your genetics provider.    PROBAND Cherry Kaiser      1960   LAB [facility]   TEST [facility] +Diagonal View panel   ACCESSION # [id number]   RESULT DATE 2023           (+90 days = 2023)   RESULT MUTYH (c.1187G>A); heterozygous       Genetic counseling appointments:   Anyone interested in pursuing genetic counseling/testing can contact the [facility] to schedule an appointment with a genetics provider by calling [phone]. In-person visits are available in [facility], and [place]. Virtual visits are available for individuals located in [place]. Virtual patients must be able to access the virtual visit through the MyChart ([facility]) portal. Anyone who is unable to see an [facility] provider or prefers an in-person visit with someone closer to home can find a genetic counselor in their area by visiting the website for the National  Society of Genetic Counselors (www.NSGC.org) and clicking Find a Genetic Counselor.     Finding out you have a genetic mutation can be difficult, but knowledge is power, and increased screening and risk-reduction strategies can be recommended to prevent cancer or identify it sooner when it is more treatable and curable.     Sincerely,        Cleo Rosario, [facility], [facility]  Senior Genetic Counselor  [facility]

## 2023-10-06 PROBLEM — Z91.89 AT HIGH RISK FOR BREAST CANCER: Status: ACTIVE | Noted: 2023-10-06

## 2023-10-06 NOTE — PROGRESS NOTES
"Reason For Consultation:   Increased lifetime risk of breast cancer    Referring Provider:   Luiza Reynaga MD  [address] [place] [address]    Records Obtained: Records of the patients history including those obtained from the referring provider were reviewed and summarized in detail.    HPI:   Cherry Kaiser presents for consultation of increased risk of breast cancer. She is postmenopausal. She presented for screening mammogram on 22  which was benign but revealed a Tyrer-Cuzick score of 15.6%.     Today, Feels good and no complaints.   No breast concerns. She does see a GYN yearly.     High Risk Breast cancer specific history:  - Age: 63 y.o.   - Height:  5'7"  - Weight: 178 lbs  - Breast density per BI-RADS:  Scattered Fibroglandular density   - Age at menarche:  14  - Number of pregnancies: ; age of first live birth: 20   - History of breast feeding: Yes - 2 years.   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 This particular mutation is described as c.1187G>A, which results in an amino acid substitution that impacts protein function. It is a known common  mutation in certain  population and found in ~1-2% of this population. 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 The VUS was identified in the CHEK2 gene, pathogenic mutations in which cause an increased risk of breast, colon and prostate cancers. However, it is unknown if the particular variant found in Cherry, labeled [id number]>C, is related to cancer risk. - Increased breast cancer surveillance with breast MRI based on >5% 10-year breast cancer risk (referral to the [facility])  - Personal history of cancer: " No  - Previous chest radiation exposure between ages 10-30 years old: No  - Personal history of breast biopsy: No  - [employer] Inheritance: No  - Family history of cancer:    Mother: diagnosed with colon cancer at 70yo and then metastatic breast cancer at 71yo and  at 71yo  Maternal aunt: 81yo who was diagnosed with breast cancer at ~54yo; no known genetic testing  Maternal uncle: diagnosed with thyroid cancer in her 50s, them prostate cancer in his 60s and leukemia in his 60s (which caused his death)  Maternal grandmother: diagnosed with lung cancer and  at 91yo  Maternal great uncle (grandmother's brother): diagnosed with stomach cancer and  in his 60s  Maternal great uncle (grandmother's brother): diagnosed with colon cancer and  in his 70s  Maternal great uncle (grandmother's brother): diagnosed with leukemia and  in his 70s    Social History:  Tobacco use:  None  Alcohol use:  Rarely  Exercise regimen: Tries to   Employment: Retired -      SEE CALCULATED RISK BELOW.     Past Medical   Past Medical History:   Diagnosis Date    Anemia     Heart attack     Hyperlipidemia      Patient Active Problem List   Diagnosis    Tendonitis, Achilles, right    Posterior tibial tendinitis of right leg    Chronic pain of right ankle    Uterine leiomyoma    Abnormal electrocardiography    Arteriosclerosis of coronary artery    Diverticular disease    Gastroesophageal reflux disease    Hyperlipidemia    Palpitations    Spasm of back muscles    Family history of colon cancer    Family history of breast cancer    Lichen sclerosus    Incomplete defecation    Urinary incontinence, mixed    Rectocele    Cystocele, midline    Hemorrhoids    Chronic constipation    Vaginal atrophy    At high risk for breast cancer     Social History   Social History     Tobacco Use    Smoking status: Never    Smokeless tobacco: Never   Substance Use Topics    Alcohol use: Yes      Comment: occasional     Drug use: Never     Family History  Family History   Problem Relation Age of Onset    Hyperlipidemia Father     Progressive Supranuclear Palsy Father     Breast cancer Mother 70        mets    Colon cancer Mother 69    Breast cancer Maternal Aunt 55    Lung cancer Maternal Grandmother     Leukemia Maternal Uncle         dx in 60s    Thyroid cancer Maternal Uncle         dx in 50s    Prostate cancer Maternal Uncle         dx in 60s    Stomach cancer Other     Colon cancer Other     Leukemia Other     Diabetes Neg Hx     Hypertension Neg Hx     Ovarian cancer Neg Hx     Stroke Neg Hx      Medications    Current Outpatient Medications:     atorvastatin (LIPITOR) 10 MG tablet, Take 10 mg by mouth., Disp: , Rfl:     azithromycin (Z-HEMA) 250 MG tablet, Take by mouth., Disp: , Rfl:     benzonatate (TESSALON) 100 MG capsule, Take 100 mg by mouth 3 (three) times daily., Disp: , Rfl:     cholecalciferol, vitamin D3, (VITAMIN D3 ORAL), Take by mouth., Disp: , Rfl:     clobetasol 0.05% (TEMOVATE) 0.05 % Oint, Apply topically once daily., Disp: 30 g, Rfl: 3    cyanocobalamin, vitamin B-12, (VITAMIN B-12 ORAL), Take by mouth., Disp: , Rfl:     cyclobenzaprine HCl (FLEXERIL ORAL), Take by mouth., Disp: , Rfl:     diphenhydramine HCl (BENADRYL ORAL), Take by mouth., Disp: , Rfl:     estradioL (ESTRACE) 0.01 % (0.1 mg/gram) vaginal cream, Place 0.5 g vaginally 3 (three) times a week., Disp: 42.5 g, Rfl: 4    fluticasone propionate (FLONASE) 50 mcg/actuation nasal spray, fluticasone propionate 50 mcg/actuation nasal spray,suspension, Disp: , Rfl:     green tea leaf extract (GREEN TEA ORAL), Take by mouth., Disp: , Rfl:     magnesium oxide (MAGOX ORAL), Take 400 mg by mouth., Disp: , Rfl:     metoprolol tartrate (LOPRESSOR) 25 MG tablet, Take 25 mg by mouth 2 (two) times daily., Disp: , Rfl:     MULTIVITAMIN ORAL, Multivitamins, Disp: , Rfl:     mupirocin (BACTROBAN) 2 %  "ointment, mupirocin 2 % topical ointment, Disp: , Rfl:     plant stanol ernie (CHOLEST OFF ORAL), Take by mouth., Disp: , Rfl:     RABEprazole (ACIPHEX) 20 mg tablet, rabeprazole 20 mg tablet,delayed release, Disp: , Rfl:     rosuvastatin (CRESTOR) 5 MG tablet, Take 1 tablet by mouth once daily., Disp: , Rfl:     rosuvastatin (CRESTOR) 5 MG tablet, Take 5 mg by mouth once daily., Disp: , Rfl:   Allergies  Review of patient's allergies indicates:  No Known Allergies    Review of Systems       See above   Review of Systems  Review of Systems  All other systems reviewed and are negative.    Objective:      Vitals:   Vitals:    10/10/23 1444   BP: (!) 145/72   Pulse: 75   Resp: 18   Temp: 98.1 °F (36.7 °C)   TempSrc: Oral   SpO2: 97%   Weight: 80.3 kg (177 lb 0.5 oz)   Height: 5' 7" (1.702 m)     BMI: Body mass index is 27.73 kg/m².   Body surface area is 1.95 meters squared.    Physical Exam  Vitals and nursing note reviewed.   Constitutional:       General: She is not in acute distress.     Appearance: Normal appearance. She is well-developed.   HENT:      Head: Normocephalic.   Cardiovascular:      Rate and Rhythm: Normal rate and regular rhythm.      Heart sounds: Normal heart sounds.   Pulmonary:      Effort: Pulmonary effort is normal.      Breath sounds: Normal breath sounds.   Chest:   Breasts:     Right: Normal.      Left: Normal.      Comments: Mild generalized tenderness  Musculoskeletal:      Cervical back: Normal range of motion.   Lymphadenopathy:      Cervical: No cervical adenopathy.      Upper Body:      Right upper body: No supraclavicular or axillary adenopathy.      Left upper body: No supraclavicular adenopathy.   Skin:     General: Skin is warm and dry.      Findings: No rash.   Neurological:      Mental Status: She is alert and oriented to person, place, and time.   Psychiatric:         Behavior: Behavior normal.     Laboratory Data: reviewed most recent   Imaging: reviewed most " recent    Assessment:     1. Family history of breast cancer    2. At high risk for breast cancer        1. Increased risk of breast cancer   * Tyrer-Cuzick (TC) lifetime risk of 8.5%. 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  We discussed that TC score will categorize your lifetime risk of being diagnosed with breast cancer. Categories are as such: Average risk <15%, Intermediate risk 15-19%, and High risk > or = to 20%.    * The Megan Model for Breast Cancer risk: She has a 1.1% 5-year breast cancer risk (Compared with 1.8% for the average 61 y.o. woman) and 5.2% Lifetime breast cancer risk (Compared with 8.8% for the average 61 y.o. woman). A woman's risk is considered low if her five-year risk of developing breast cancer is less than 1.6%; it is considered high if she scores above 1.66%. (All women who are over 60 have a score of at least 1.66 and are considered high risk, based on the Megan Model.)      Risk factors are categorized into 2 groups: Modifiable and Non-modifiable. Modifiable risk factors include use of hormones, alcohol, smoking, diet and exercise. Non-modifiable risk factors include breast density, genetics, chest radiation, previous pregnancies, age of first period, and age of menopause.    * For women at high risk for breast cancer, endocrine therapy can reduce the risk of invasive and/or in situ breast cancers. (tamoxifen for premenopausal or postmenopausal women and raloxifene or exemestane for postmenopausal women).   * Discussed that Tamoxifen 20 mg daily for 5 years has shown to reduce risk of breast cancer by 49% and women with ADH/ALH or LCIS have an even more significant reduction of risk of 86%. Aromatase inhibitors for 5 years have also shown risk reduction in terms of 50-60%. At current, there is not adequate data to recommend longer courses of therapy more than 5 years for risk reduction.    * Tamoxifen has limited data in BRCA 1/2 mutation  carriers but limited retrospective data is suggestive of benefit. There is retrospective data that aromatase inhibitors can reduce the risk of contralateral ER positive breast cancers in BRCA 1/2 patients who were taking AIs as adjuvant therapy. There is no data for raloxifen in the population.    * Reviewed risks of Tamoxifen side effects include hot flashes, invasive endometrial cancer in women > 49 years of age (2.3/1000 compared to 0.9/1000), cataracts, increased risk of pulmonary embolism among others.    * Reviewed Lifestyle modifications which have shown benefit:  Limit alcohol consumption to less than 1 drink per day (1 ounce liquor, 6 oz wine, 8 oz beer)  Avoid smoking.  Exercise at least 150 minutes per week of moderate intensity aerobic activity or at least 75 minutes of vigorous activity. Exercise can lower the relative risk of breast cancer by ~18-20%.  Maintain healthy weight and avoid post-menopausal weight gain. Avoid processed foods and eat more lean proteins, fruits and vegetables.                * Discussed available resources including genetic counseling, nutrition, weight management.    * Reviewed future screening:   Semiannual (every 6 months) CBE (clinical breast exam).   Annual Breast MRI alternating with an annual MMG. if TC 20% or greater.                   Discussed with patient that there are several models available for stratifying breast cancer risk, and Tyrer-Kleverzick is presently the model utilized by [facility] and is a model recommended per current NCCN guidelines.    The Megan Model for Breast Cancer risk estimates the absolute 5 year risk and lifetime risk of developing breast cancer. Family history includes only first degree relatives with breast cancer, which is not enough information to estimate the risk of a patient having BRCA mutation. It also underestimates the cancer risk for patients with extensive family history. The Megan Model is a good predictor of risk for  populations but not for individuals. It adjusts risk for race/ethnicity. It may underestimate breast cancer risk in patients with atypical hyperplasia and strong family history. The Megan Model was NOT designed to estimate risk for: Women with a prior diagnosis of breast cancer, lobular carcinoma in situ (LCIS), or ductal carcinoma in situ (DCIS);  Women who have received previous radiation therapy to the chest for treatment of Hodgkin lymphoma;  Women with gene mutations in BRCA1 or BRCA2, or those who are known to have certain genetic syndromes that increase risk for breast cancer; Women of age <35 or >85.    We discussed that there are limitations to every model for risk assessment, particularly that TC can overestimate risk in women with atypical hyperplasia and dense breasts and that Megan underestimates risk for those with a strong family history of breast or ovarian cancers as well as non-white women with atypical hyperplasia which can make them appear to not be candidates for risk reducing therapies.       Plan:     Patient has opted out chemoprevention but will call in the future if she wishes to pursue.   Patient elects to proceed with alternating annual mammogram and annual breast MRI along with semiannual CBEs.  Patient will follow up with PCP or GYN for one semiannual CBE along with annual mammogram in May 2024 and will RTC here for one semiannual CBE along with annual breast MRI in October 2023.  Lifestyle modifications as detailed above.   5.   Encouraged breast awareness, including monthly breast self-exams.       RTC in 1 year to see me either at [facility] or on 3rd floor.     Questions were encouraged and answered to patient's satisfaction, and patient verbalized understanding of information and agreement with the plan. Advised patient to RTC with any interval changes or concerns.        Patient is in agreement with the proposed treatment plan. All questions were answered to the patient's satisfaction.  Patient knows to call clinic for any new or worsening symptoms and if anything is needed before the next clinic visit.          Evelin Alejandre, VARGASP-C  Hematology & Medical Oncology   [facility] [place] [address]  ph. [phone]  Fax. [phone]    Collaborating physician, Dr. Izaguirre.    Total time spent with the patient: 40 minutes, 30 minutes of face to face consultation and 10 minutes of chart review and coordination of care, on the day of the visit. This includes face to face time and non-face to face time preparing to see the patient (eg, review of tests), obtaining and/or reviewing separately obtained history, documenting clinical information in the electronic or other health record, independently interpreting resultsand communicating results to the patient/family/caregiver, or care coordination.    Route Chart for Scheduling    Med Onc Chart Routing      Follow up with physician    Follow up with [facility] 1 year.   Infusion scheduling note    Injection scheduling note    Labs    Imaging MRI and mammogram   MRI due after Oct 24, mammo due in 6 months   Pharmacy appointment    Other referrals

## 2023-10-10 ENCOUNTER — OFFICE VISIT (OUTPATIENT)
Dept: HEMATOLOGY/ONCOLOGY | Facility: CLINIC | Age: 63
End: 2023-10-10
Payer: COMMERCIAL

## 2023-10-10 VITALS
HEART RATE: 75 BPM | SYSTOLIC BLOOD PRESSURE: 145 MMHG | TEMPERATURE: 98 F | BODY MASS INDEX: 27.78 KG/M2 | RESPIRATION RATE: 18 BRPM | WEIGHT: 177 LBS | OXYGEN SATURATION: 97 % | HEIGHT: 67 IN | DIASTOLIC BLOOD PRESSURE: 72 MMHG

## 2023-10-10 DIAGNOSIS — Z91.89 AT HIGH RISK FOR BREAST CANCER: ICD-10-CM

## 2023-10-10 DIAGNOSIS — Z80.3 FAMILY HISTORY OF BREAST CANCER: Primary | ICD-10-CM

## 2023-10-10 PROCEDURE — 99215 PR OFFICE/OUTPT VISIT, EST, LEVL V, 40-54 MIN: ICD-10-PCS | Mod: S$GLB,,, | Performed by: NURSE PRACTITIONER

## 2023-10-10 PROCEDURE — 99999 PR PBB SHADOW E&M-EST. PATIENT-LVL V: CPT | Mod: PBBFAC,,, | Performed by: NURSE PRACTITIONER

## 2023-10-10 PROCEDURE — 99999 PR PBB SHADOW E&M-EST. PATIENT-LVL V: ICD-10-PCS | Mod: PBBFAC,,, | Performed by: NURSE PRACTITIONER

## 2023-10-10 PROCEDURE — 99215 OFFICE O/P EST HI 40 MIN: CPT | Mod: S$GLB,,, | Performed by: NURSE PRACTITIONER

## 2023-11-14 ENCOUNTER — HOSPITAL ENCOUNTER (OUTPATIENT)
Dept: RADIOLOGY | Facility: HOSPITAL | Age: 63
Discharge: HOME | End: 2023-11-14
Attending: NURSE PRACTITIONER
Payer: COMMERCIAL

## 2023-11-14 DIAGNOSIS — Z80.3 FAMILY HISTORY OF BREAST CANCER: ICD-10-CM

## 2023-11-14 DIAGNOSIS — Z91.89 AT HIGH RISK FOR BREAST CANCER: ICD-10-CM

## 2023-11-14 PROCEDURE — A9577 INJ MULTIHANCE: HCPCS | Performed by: NURSE PRACTITIONER

## 2023-11-14 PROCEDURE — 77049 MRI BREAST C-+ W/CAD BI: CPT | Mod: TC

## 2023-11-14 PROCEDURE — 25500020 PHARM REV CODE 255: Performed by: NURSE PRACTITIONER

## 2023-11-14 PROCEDURE — 77049 MRI BREAST W/WO CONTRAST, W/CAD, BILATERAL: ICD-10-PCS | Mod: 26,,, | Performed by: RADIOLOGY

## 2023-11-14 PROCEDURE — 77049 MRI BREAST C-+ W/CAD BI: CPT | Mod: 26,,, | Performed by: RADIOLOGY

## 2023-11-14 RX ADMIN — GADOBENATE DIMEGLUMINE 17 ML: 529 INJECTION, SOLUTION INTRAVENOUS at 02:11

## 2023-12-27 ENCOUNTER — OFFICE VISIT (OUTPATIENT)
Dept: UROGYNECOLOGY | Facility: CLINIC | Age: 63
End: 2023-12-27
Payer: COMMERCIAL

## 2023-12-27 ENCOUNTER — LAB VISIT (OUTPATIENT)
Dept: LAB | Facility: HOSPITAL | Age: 63
End: 2023-12-27
Payer: COMMERCIAL

## 2023-12-27 VITALS
DIASTOLIC BLOOD PRESSURE: 92 MMHG | WEIGHT: 177.5 LBS | HEART RATE: 78 BPM | BODY MASS INDEX: 27.86 KG/M2 | SYSTOLIC BLOOD PRESSURE: 143 MMHG | HEIGHT: 67 IN

## 2023-12-27 DIAGNOSIS — Z80.3 FAMILY HISTORY OF BREAST CANCER: ICD-10-CM

## 2023-12-27 DIAGNOSIS — K59.09 CHRONIC CONSTIPATION: ICD-10-CM

## 2023-12-27 DIAGNOSIS — N81.11 CYSTOCELE, MIDLINE: ICD-10-CM

## 2023-12-27 DIAGNOSIS — Z91.89 AT HIGH RISK FOR BREAST CANCER: ICD-10-CM

## 2023-12-27 DIAGNOSIS — L90.0 LICHEN SCLEROSUS: Primary | ICD-10-CM

## 2023-12-27 DIAGNOSIS — N81.6 RECTOCELE: ICD-10-CM

## 2023-12-27 PROCEDURE — 99213 OFFICE O/P EST LOW 20 MIN: CPT | Mod: S$GLB,,, | Performed by: NURSE PRACTITIONER

## 2023-12-27 PROCEDURE — 36415 COLL VENOUS BLD VENIPUNCTURE: CPT | Performed by: NURSE PRACTITIONER

## 2023-12-27 PROCEDURE — 99999 PR PBB SHADOW E&M-EST. PATIENT-LVL V: ICD-10-PCS | Mod: PBBFAC,,, | Performed by: NURSE PRACTITIONER

## 2023-12-27 PROCEDURE — 99999 PR PBB SHADOW E&M-EST. PATIENT-LVL V: CPT | Mod: PBBFAC,,, | Performed by: NURSE PRACTITIONER

## 2023-12-27 PROCEDURE — 82672 ASSAY OF ESTROGEN: CPT | Performed by: NURSE PRACTITIONER

## 2023-12-27 PROCEDURE — 99213 PR OFFICE/OUTPT VISIT, EST, LEVL III, 20-29 MIN: ICD-10-PCS | Mod: S$GLB,,, | Performed by: NURSE PRACTITIONER

## 2023-12-27 NOTE — PATIENT INSTRUCTIONS
1)  Mixed urinary incontinence, urge < stress:    --Empty bladder every 3 hours.  Empty well: wait a minute, lean forward on toilet.    --Avoid dietary irritants (see sheet).  Keep diary x 3-5 days to determine your irritants.  --continue pelvic floor PT exercises at home  --URGE: consider medication in future. Takes 2-4 weeks to see if will have effect.  For dry mouth: get sour, sugar free lozenge or gum.    --STRESS:  Pessary vs. Sling.      2)  Concern for lichen sclerosis:  --Two TIMES A WEEK AT NIGHT apply estrogen cream.  Apply dime-sized amount with finger to vaginal opening, inner lips, and all external areas (including around anus) that are irritated. Also apply small amount inside vagina with finger (insert to knuckle).  --use small amount of steroid ointment just around perineium (between vagina and rectum)     3)  Constipation with incomplete defecation, +hemorrhoids:  --hydrate well: 6-8 glasses/day (approx 64 oz)  --take magnesium oxide 400 mg daily or supplement like calm  --continue daily fiber with water  --try to add 1-2 prunes or warm prune juice (1/4 cup)       4)  Stage 2 rectocele/cystocele:  --discussed  --observation for now  --work on getting stool consistency better 1st  --don't sit on toilet more than 5 minutes  --consider surgery (A&P repair) if not improving  --continue PT home exercises     5)  estrogen level today    6) RTC 6 months.

## 2023-12-27 NOTE — PROGRESS NOTES
Urogyn follow up  12/27/2023  .  [facility]  [address]  [facility] [id number]    Cherry Kaiser  [id number]  1960      Cherry Kaiser is a 63 y.o. here for a urogyn follow up for mixed urinary incontinence, constipation, vaginal prolapse and lichen sclerosus.    1/4/2023.     1)  UI:  (+) HAYLIE (full bladder)  > (+) UUI (rare).  (--) pads. Daytime frequency: Q 3 hours.  Nocturia: No. (--) dysuria,  (--) hematuria,  (--) frequent UTIs.  (+) complete bladder emptying.      2)  POP:  Present--saw something in mirror when applying vaginal estrogen for lichen. Symptoms: (--).  (--) vaginal bleeding. (--) vaginal discharge. (+) sexually active.  (--) dyspareunia.  (+)  Vaginal dryness.  (+) vaginal estrogen use.   --POP-Q:  Aa 0; Ba 0; C -8; Ap 0; Bp 0.  Genital hiatus 4, perineal body 2, total vaginal length 9-10.  Pvr 30 mL     3)  BM:  (+) constipation/straining. Takes fish oil, magnesium PRN. Fiber bulked too much.  (--) chronic diarrhea. (--) hematochezia. +BRB on wiping with hemorrhoids.  (--) fecal incontinence.  (--) fecal smearing/urgency.  (+)/-- complete evacuation.      07/26/2023     1)  Mixed urinary incontinence, urge < stress:    --baseline:  (+) HAYLIE (full bladder)  > (+) UUI (rare).  (--) pads. Daytime frequency: Q 3 hours.  --currently: no major issues; no pad use  --has been going to PT     2)  Concern for lichen sclerosis:  --used steroid + estrogen daily x 2 months  --now just using estrogen cream 2x/month     3)  Constipation with incomplete defecation, +hemorrhoids:  --still having some irregularity   --taking metamucil--1 tsp--states wasn't better with increase in dose  --drinks 6 x 8oz glasses of water/day  --has not noted dietary triggers (h/o diverticulitis)  --does have to strain at times but is working with PT     4)  Stage 2 rectocele/cystocele:  --no major symptoms   POP-Q:  Aa 0; Ba 0; C -8; Ap 0; Bp 0.  Genital hiatus 4, perineal body 2, total  vaginal length 9-10.     5)  FH breast and colon cancer:  --did not do full genetics consult    Changes from last visit:  1)  Mixed urinary incontinence, urge < stress:    --denies UI  --rare urinary urgency if ignores urge  --+HAYLIE with full bladder and sneeze      2)  Concern for lichen sclerosis:  --feels slightly irritated  --has been using estrogen cream once weekly and steroid not at all    3)  Constipation with incomplete defecation, +hemorrhoids:  --completed pelvic floor PT-- does do abdominal massage, but not all pelvi floor exercises  --intermittent constipation--does not strain if she splints  --drinking approximately 6 eight ounce glasses/ day  --alternates 2 gummies but not always with water with 1 tablespoon fiber   --takes flax seed capsules     4)  Stage 2 rectocele/cystocele:  --lowery not feel like it has it has worsened     5)  FH breast and colon cancer:  --genetics consult completed-- MUTYH+ (het.)  --will alternate every other year mammogram with breast MRI with [facility] twice yearly          Past Medical History:   Diagnosis Date    Anemia     Heart attack 2016    Hyperlipidemia        Past Surgical History:   Procedure Laterality Date    HYSTERECTOMY      WISDOM TOOTH EXTRACTION         Family History   Problem Relation Age of Onset    Hyperlipidemia Father     Progressive Supranuclear Palsy Father     Breast cancer Mother 70        mets    Colon cancer Mother 69    Breast cancer Maternal Aunt 55    Lung cancer Maternal Grandmother     Leukemia Maternal Uncle         dx in 60s    Thyroid cancer Maternal Uncle         dx in 50s    Prostate cancer Maternal Uncle         dx in 60s    Stomach cancer Other     Colon cancer Other     Leukemia Other     Diabetes Neg Hx     Hypertension Neg Hx     Ovarian cancer Neg Hx     Stroke Neg Hx        Social History     Socioeconomic History    Marital status:    Tobacco Use    Smoking status: Never    Smokeless tobacco: Never   Substance and Sexual Activity     Alcohol use: Yes     Comment: occasional     Drug use: Never    Sexual activity: Yes     Partners: Male     Birth control/protection: See Surgical Hx       Current Outpatient Medications   Medication Sig Dispense Refill    atorvastatin (LIPITOR) 10 MG tablet Take 10 mg by mouth.      azithromycin (Z-HEMA) 250 MG tablet Take by mouth.      benzonatate (TESSALON) 100 MG capsule Take 100 mg by mouth 3 (three) times daily.      cholecalciferol, vitamin D3, (VITAMIN D3 ORAL) Take by mouth.      cyanocobalamin, vitamin B-12, (VITAMIN B-12 ORAL) Take by mouth.      cyclobenzaprine HCl (FLEXERIL ORAL) Take by mouth.      diphenhydramine HCl (BENADRYL ORAL) Take by mouth.      estradioL (ESTRACE) 0.01 % (0.1 mg/gram) vaginal cream Place 0.5 g vaginally 3 (three) times a week. 42.5 g 4    fluticasone propionate (FLONASE) 50 mcg/actuation nasal spray fluticasone propionate 50 mcg/actuation nasal spray,suspension      green tea leaf extract (GREEN TEA ORAL) Take by mouth.      magnesium oxide (MAGOX ORAL) Take 400 mg by mouth.      metoprolol tartrate (LOPRESSOR) 25 MG tablet Take 25 mg by mouth 2 (two) times daily.      MULTIVITAMIN ORAL Multivitamins      mupirocin (BACTROBAN) 2 % ointment mupirocin 2 % topical ointment      plant stanol ernie (CHOLEST OFF ORAL) Take by mouth.      RABEprazole (ACIPHEX) 20 mg tablet rabeprazole 20 mg tablet,delayed release      rosuvastatin (CRESTOR) 5 MG tablet Take 1 tablet by mouth once daily.      rosuvastatin (CRESTOR) 5 MG tablet Take 5 mg by mouth once daily.      clobetasol 0.05% (TEMOVATE) 0.05 % Oint Apply topically once daily. (Patient not taking: Reported on 10/10/2023) 30 g 3     No current facility-administered medications for this visit.       Review of patient's allergies indicates:  No Known Allergies    Well woman:  Pap test: post hyst.  History of abnormal paps: No.  History of STIs:  No  Mammogram: Date of last: 10/2023 breast mri negative  Colonoscopy: Date of last:  "2022.  Result:  normal per report ([facility]).  Repeat due:  2027.    DEXA: none    ROS:  As per HPI.      Exam  BP (!) 143/92 (BP Location: Left arm, Patient Position: Sitting, BP Method: Medium (Automatic))   Pulse 78   Ht 5' 7" (1.702 m)   Wt 80.5 kg (177 lb 7.5 oz)   BMI 27.80 kg/m²   General: alert and oriented, no acute distress  Respiratory: normal respiratory effort  Abd: soft, non-tender, non-distended    Pelvic  Ext. Genitalia: normal external genitalia.  Mild hypopigmentation around perineum. Area of hyperpigmentation on lower 1/3 of R labia minora-- no texture change or multiple colors noted.  Normal bartholin's and skeens glands  Rest of pelvic deferred    Impression  1. Lichen sclerosus        2. Rectocele        3. Chronic constipation        4. Cystocele, midline        5. Family history of breast cancer  ESTROGENS, TOTAL      6. At high risk for breast cancer  ESTROGENS, TOTAL        We reviewed the above issues and discussed options for short-term versus long-term management of her problems.   Plan:      1)  Mixed urinary incontinence, urge < stress:    --Empty bladder every 3 hours.  Empty well: wait a minute, lean forward on toilet.    --Avoid dietary irritants (see sheet).  Keep diary x 3-5 days to determine your irritants.  --continue pelvic floor PT exercises at home  --URGE: consider medication in future. Takes 2-4 weeks to see if will have effect.  For dry mouth: get sour, sugar free lozenge or gum.    --STRESS:  Pessary vs. Sling.      2)  Concern for lichen sclerosis:  --Two TIMES A WEEK AT NIGHT apply estrogen cream.  Apply dime-sized amount with finger to vaginal opening, inner lips, and all external areas (including around anus) that are irritated. Also apply small amount inside vagina with finger (insert to knuckle).  --use small amount of steroid ointment just around perineium (between vagina and rectum)     3)  Constipation with incomplete defecation, +hemorrhoids:  --hydrate " well: 6-8 glasses/day (approx 64 oz)  --take magnesium oxide 400 mg daily or supplement like calm  --continue daily fiber with water  --try to add 1-2 prunes or warm prune juice (1/4 cup)       4)  Stage 2 rectocele/cystocele:  --discussed  --observation for now  --work on getting stool consistency better 1st  --don't sit on toilet more than 5 minutes  --consider surgery (A&P repair) if not improving  --continue PT home exercises     5)  estrogen level today    6) RTC 6 months.       I spent a total of 20 minutes on the day of the visit.  This includes face to face time and non-face to face time preparing to see the patient (eg, review of tests), obtaining and/or reviewing separately obtained history, documenting clinical information in the electronic or other health record, independently interpreting results and communicating results to the patient/family/caregiver, or care coordinator.       Imelda Flores, VARGASP-BC  [facility]  Division of Female Pelvic Medicine and Reconstructive Surgery  Department of Obstetrics & Gynecology

## 2023-12-29 LAB — ESTROGEN SERPL-MCNC: 47 PG/ML

## 2024-06-07 ENCOUNTER — TELEPHONE (OUTPATIENT)
Dept: HEMATOLOGY/ONCOLOGY | Facility: CLINIC | Age: 64
End: 2024-06-07
Payer: COMMERCIAL

## 2025-08-26 ENCOUNTER — LAB VISIT (OUTPATIENT)
Dept: LAB | Facility: HOSPITAL | Age: 65
End: 2025-08-26
Attending: INTERNAL MEDICINE
Payer: COMMERCIAL

## 2025-08-26 DIAGNOSIS — Z00.00 HEALTH CARE MAINTENANCE: ICD-10-CM

## 2025-08-26 DIAGNOSIS — N95.1 MENOPAUSAL STATE: ICD-10-CM

## 2025-08-26 LAB
ALBUMIN SERPL BCP-MCNC: 4.5 G/DL (ref 3.5–5.2)
ALP SERPL-CCNC: 99 UNIT/L (ref 40–150)
ALT SERPL W/O P-5'-P-CCNC: 41 UNIT/L (ref 0–55)
ANION GAP (OHS): 6 MMOL/L (ref 8–16)
AST SERPL-CCNC: 30 UNIT/L (ref 0–50)
BILIRUB SERPL-MCNC: 0.8 MG/DL (ref 0.1–1)
BUN SERPL-MCNC: 14 MG/DL (ref 8–23)
CALCIUM SERPL-MCNC: 9.6 MG/DL (ref 8.7–10.5)
CHLORIDE SERPL-SCNC: 104 MMOL/L (ref 95–110)
CHOLEST SERPL-MCNC: 181 MG/DL (ref 120–199)
CHOLEST/HDLC SERPL: 3.7 {RATIO} (ref 2–5)
CO2 SERPL-SCNC: 29 MMOL/L (ref 23–29)
CREAT SERPL-MCNC: 0.8 MG/DL (ref 0.5–1.4)
ESTRADIOL SERPL HS-MCNC: <10 PG/ML
GFR SERPLBLD CREATININE-BSD FMLA CKD-EPI: >60 ML/MIN/1.73/M2
GLUCOSE SERPL-MCNC: 102 MG/DL (ref 70–110)
HDLC SERPL-MCNC: 49 MG/DL (ref 40–75)
HDLC SERPL: 27.1 % (ref 20–50)
LDLC SERPL CALC-MCNC: 106.6 MG/DL (ref 63–159)
NONHDLC SERPL-MCNC: 132 MG/DL
POTASSIUM SERPL-SCNC: 4 MMOL/L (ref 3.5–5.1)
PROT SERPL-MCNC: 7.3 GM/DL (ref 6–8.4)
SODIUM SERPL-SCNC: 139 MMOL/L (ref 136–145)
TESTOST SERPL-MCNC: 15 NG/DL (ref 5–73)
TRIGL SERPL-MCNC: 127 MG/DL (ref 30–150)

## 2025-08-26 PROCEDURE — 36415 COLL VENOUS BLD VENIPUNCTURE: CPT

## 2025-08-26 PROCEDURE — 80061 LIPID PANEL: CPT

## 2025-08-26 PROCEDURE — 82670 ASSAY OF TOTAL ESTRADIOL: CPT

## 2025-08-26 PROCEDURE — 80053 COMPREHEN METABOLIC PANEL: CPT

## 2025-08-26 PROCEDURE — 84403 ASSAY OF TOTAL TESTOSTERONE: CPT | Mod: 59

## 2025-08-26 PROCEDURE — 84403 ASSAY OF TOTAL TESTOSTERONE: CPT

## 2025-08-26 PROCEDURE — 84402 ASSAY OF FREE TESTOSTERONE: CPT

## 2025-08-28 LAB
W ALBUMIN: 4.6 G/DL
W SEX HORMONE BINDING GLOBULIN: 33 NMOL/L
W TESTOSTERONE, BIOAVAIL: 1.7 NG/DL
W TESTOSTERONE, FREE: 0.8 PG/ML
W TESTOSTERONE, TOTAL, MS: 7 NG/DL